# Patient Record
Sex: FEMALE
[De-identification: names, ages, dates, MRNs, and addresses within clinical notes are randomized per-mention and may not be internally consistent; named-entity substitution may affect disease eponyms.]

---

## 2017-04-20 ENCOUNTER — HOSPITAL ENCOUNTER (EMERGENCY)
Dept: HOSPITAL 14 - H.ER | Age: 42
Discharge: HOME | End: 2017-04-20
Payer: SELF-PAY

## 2017-04-20 VITALS — OXYGEN SATURATION: 100 % | TEMPERATURE: 98.3 F | HEART RATE: 77 BPM

## 2017-04-20 VITALS — SYSTOLIC BLOOD PRESSURE: 122 MMHG | DIASTOLIC BLOOD PRESSURE: 75 MMHG | RESPIRATION RATE: 14 BRPM

## 2017-04-20 DIAGNOSIS — R10.13: ICD-10-CM

## 2017-04-20 DIAGNOSIS — K29.70: Primary | ICD-10-CM

## 2017-04-20 DIAGNOSIS — F17.210: ICD-10-CM

## 2017-04-20 LAB
ALBUMIN/GLOB SERPL: 1.2 {RATIO} (ref 1–2.1)
ALP SERPL-CCNC: 70 U/L (ref 38–126)
ALT SERPL-CCNC: 24 U/L (ref 9–52)
AST SERPL-CCNC: 26 U/L (ref 14–36)
BASOPHILS # BLD AUTO: 0.1 K/UL (ref 0–0.2)
BASOPHILS NFR BLD: 1.5 % (ref 0–2)
BILIRUB SERPL-MCNC: 0.5 MG/DL (ref 0.2–1.3)
BUN SERPL-MCNC: 10 MG/DL (ref 7–17)
CALCIUM SERPL-MCNC: 10.4 MG/DL (ref 8.4–10.2)
CHLORIDE SERPL-SCNC: 109 MMOL/L (ref 98–107)
CO2 SERPL-SCNC: 22 MMOL/L (ref 22–30)
EOSINOPHIL # BLD AUTO: 0.1 K/UL (ref 0–0.7)
EOSINOPHIL NFR BLD: 1.4 % (ref 0–4)
ERYTHROCYTE [DISTWIDTH] IN BLOOD BY AUTOMATED COUNT: 13.2 % (ref 11.5–14.5)
GLOBULIN SER-MCNC: 3.5 GM/DL (ref 2.2–3.9)
GLUCOSE SERPL-MCNC: 76 MG/DL (ref 65–105)
HCT VFR BLD CALC: 40.6 % (ref 34–47)
LYMPHOCYTES # BLD AUTO: 2.9 K/UL (ref 1–4.3)
LYMPHOCYTES NFR BLD AUTO: 33.3 % (ref 20–40)
MCH RBC QN AUTO: 33 PG (ref 27–31)
MCHC RBC AUTO-ENTMCNC: 33.6 G/DL (ref 33–37)
MCV RBC AUTO: 98.2 FL (ref 81–99)
MONOCYTES # BLD: 0.6 K/UL (ref 0–0.8)
MONOCYTES NFR BLD: 7.4 % (ref 0–10)
NEUTROPHILS # BLD: 4.8 K/UL (ref 1.8–7)
NEUTROPHILS NFR BLD AUTO: 56.4 % (ref 50–75)
NRBC BLD AUTO-RTO: 0.1 % (ref 0–0)
PLATELET # BLD: 424 K/UL (ref 130–400)
PMV BLD AUTO: 7.1 FL (ref 7.2–11.7)
POTASSIUM SERPL-SCNC: 4.7 MMOL/L (ref 3.6–5)
PROT SERPL-MCNC: 7.5 G/DL (ref 6.3–8.2)
SODIUM SERPL-SCNC: 143 MMOL/L (ref 132–148)
WBC # BLD AUTO: 8.6 K/UL (ref 4.8–10.8)

## 2017-04-20 NOTE — ED PDOC
- Laboratory Results


Result Diagrams: 


 04/20/17 15:00





 04/20/17 15:00





- ECG


O2 Sat by Pulse Oximetry: 100 (RA)


Pulse Ox Interpretation: Normal





Medical Decision Making


Medical Decision Making: 


Time: 19:00


--Pending Ultrasound, reassessment, and disposition. 





Time: 8:40


--Abdomen US


FINDINGS:


Gallbladder: Within normal limits in appearance, without evidence of gallstones

, significant


gallbladder wall thickening, or pericholecystic fluid. Reportedly negative 

sonographic Huynh's sign.


Common bile duct: Does not appear abnormally dilated, measuring less than 6 mm 

in diameter.


Liver: Within normal limits in appearance. Measures 14.5 cm in length. Normal 

flow seen in the


main portal vein on color and Doppler imaging.


Pancreas: Imaged portions appear unremarkable.


Right kidney: Within normal limits in appearance. No evidence of hydronephrosis.


IMPRESSION:


No significant abnormality identified. No evidence of gallstones or 

cholecystitis.





Upon provider reevaluation patient is feeling better, is medically stable, and 

requires no further treatment in the ED at this time. Patient will be 

discharged home with Rx for Pepcid 20 mg. Counseling was provided and all 

questions were answered regarding diagnosis and need for follow up with Dr. Dozier for PMD and GI. There is agreement to discharge plan. Return if symptoms 

persist or worsen.





Clinical Impression: Gastritis and abdominal pain   











Scribe Attestation:


Documented by Olimpia Thomas, acting as a scribe for Avni Melgar MD.





Provider Scribe Attestation:


All medical record entries made by the Scribe were at my direction and 

personally dictated by me. I have reviewed the chart and agree that the record 

accurately reflects my personal performance of the history, physical exam, 

medical decision making, and the department course for this patient. I have 

also personally directed, reviewed, and agree with the discharge instructions 

and disposition.





Disposition


Counseled Patient/Family Regarding: Studies Performed, Diagnosis, Need For 

Followup, Rx Given





- Clinical Impression


Clinical Impression: 


 Abdominal pain, Gastritis





- POA


Present On Arrival: None





- Disposition


Disposition: Routine/Home


Disposition Time: 08:40


Condition: STABLE


Prescriptions: 


Famotidine [Pepcid] 20 mg PO Q12 #14 tab


Instructions:  Gastritis (ED)

## 2017-04-20 NOTE — US
EXAM:

  US Abdomen Limited, Right Upper Quadrant



CLINICAL HISTORY:

  41 years old, female; Pain; Abdominal pain; Epigastric; Additional info: Abd 

pain



TECHNIQUE:

  Real-time ultrasound of the right upper quadrant with image documentation.



EXAM DATE/TIME:

  4/20/2017 2:27 PM



COMPARISON:

  Prior abdominal ultrasound of 11/24/2015



FINDINGS:

  Gallbladder: Within normal limits in appearance, without evidence of 

gallstones, significant gallbladder wall thickening, or pericholecystic fluid. 

Reportedly negative sonographic Huynh's sign.



  Common bile duct: Does not appear abnormally dilated, measuring less than 6 

mm in diameter.



  Liver:  Within normal limits in appearance. Measures 14.5 cm in length.  

Normal flow seen in the main portal vein on color and Doppler imaging.



  Pancreas:  Imaged portions appear unremarkable.



  Right kidney:  Within normal limits in appearance. No evidence of 

hydronephrosis.



IMPRESSION:   

  No significant abnormality identified.  No evidence of gallstones or 

cholecystitis.

## 2017-04-20 NOTE — ED PDOC
HPI: Abdomen


Time Seen by Provider: 04/20/17 14:21


Chief Complaint (Nursing): Abdominal Pain


Chief Complaint (Provider): Abdominal Pain


History Per: Patient


History/Exam Limitations: no limitations


Onset/Duration Of Symptoms: Days (x1 month)


Current Symptoms Are (Timing): Still Present


Additional Complaint(s): 


42 y/o female with a past medical history of thyroid disease (hypo) who 

presents to the emergency department with a complaint of an intermittent 

abdominal pain x1 month. Associated with burning sensation during urination and 

a foul smelling urine. Denies vomiting, diarrhea, or fever. 





PMD: Dr. Dozier





Past Medical History


Reviewed: Historical Data, Nursing Documentation, Vital Signs


Vital Signs: 


 Last Vital Signs











Temp  98.3 F   04/20/17 13:50


 


Pulse  77   04/20/17 16:48


 


Resp  14   04/20/17 16:48


 


BP  122/75   04/20/17 16:48


 


Pulse Ox  100   04/20/17 16:48














- Medical History


PMH: Back Problems, Hyperthyroidism


   Denies: Diabetes, Hepatitis, HIV, HTN, Seizures, Sexually Transmitted Disease





- Surgical History


Surgical History: No Surg Hx





- Family History


Family History: States: Unknown Family Hx





- Social History


Current smoker - smoking cessation education provided: Yes (Heavy Smoker > 10 

Cigarettes Daily)


Alcohol: None


Drugs: Denies





- Home Medications


Home Medications: 


 Ambulatory Orders











 Medication  Instructions  Recorded


 


No Known Home Med  04/20/17














- Allergies


Allergies/Adverse Reactions: 


 Allergies











Allergy/AdvReac Type Severity Reaction Status Date / Time


 


No Known Allergies Allergy   Verified 08/03/16 13:22














Review of Systems


ROS Statement: Except As Marked, All Systems Reviewed And Found Negative


Constitutional: Negative for: Fever


Gastrointestinal: Positive for: Abdominal Pain.  Negative for: Vomiting, 

Diarrhea


Genitourinary Female: Positive for: Dysuria, Other (Foul smelling urine)





Physical Exam





- Reviewed


Nursing Documentation Reviewed: Yes


Vital Signs Reviewed: Yes





- Physical Exam


Appears: Positive for: Non-toxic, No Acute Distress


Head Exam: Positive for: ATRAUMATIC, NORMOCEPHALIC


Skin: Positive for: Normal Color, Warm, Dry


Neck: Positive for: Normal, Supple


Cardiovascular/Chest: Positive for: Regular Rate, Rhythm.  Negative for: Murmur


Respiratory: Positive for: Normal Breath Sounds.  Negative for: Accessory 

Muscle Use, Respiratory Distress


Gastrointestinal/Abdominal: Positive for: Soft, Tenderness (to the epigastric 

region).  Negative for: Normal Exam


Back: Positive for: Normal Inspection.  Negative for: L CVA Tenderness, R CVA 

Tenderness


Extremity: Positive for: Normal ROM


Neurologic/Psych: Positive for: Alert, Oriented





- Laboratory Results


Result Diagrams: 


 04/20/17 15:00





 04/20/17 15:00





- ECG


O2 Sat by Pulse Oximetry: 100 (RA)


Pulse Ox Interpretation: Normal





Medical Decision Making


Medical Decision Making: 


Time: 14:21


Initial impression: Abdominal pain


Initial plan:


--COMP Metabolic Panel


--ED Urine Pregnancy


--ED Urine Dipstick (POC)


--CBC w/ differential


--Sodium Chloride 1,000 ml  mls/hr


--Pepcid 20 mg IVP


--Abdomen Limited (GB Included) (US)


--Revaluation














Scribe Attestation:


Documented by Olimpia Thomas, acting as a scribe for Chuy Do MD.





Provider Scribe Attestation:


All medical record entries made by the Scribe were at my direction and 

personally dictated by me. I have reviewed the chart and agree that the record 

accurately reflects my personal performance of the history, physical exam, 

medical decision making, and the department course for this patient. I have 

also personally directed, reviewed, and agree with the discharge instructions 

and disposition.





Disposition





- Clinical Impression


Clinical Impression: 


 Abdominal pain





- Patient ED Disposition


Is Patient to be Admitted: Transfer of Care





- Disposition


Disposition: Transfer of Care


Disposition Time: 19:00


Condition: FAIR


Patient Signed Over To: Avni Melgar

## 2017-09-05 ENCOUNTER — HOSPITAL ENCOUNTER (EMERGENCY)
Dept: HOSPITAL 14 - H.ER | Age: 42
LOS: 1 days | Discharge: HOME | End: 2017-09-06
Payer: SELF-PAY

## 2017-09-05 DIAGNOSIS — N83.209: ICD-10-CM

## 2017-09-05 DIAGNOSIS — I10: ICD-10-CM

## 2017-09-05 DIAGNOSIS — R10.2: Primary | ICD-10-CM

## 2017-09-05 DIAGNOSIS — N85.2: ICD-10-CM

## 2017-09-05 DIAGNOSIS — R07.9: ICD-10-CM

## 2017-09-05 DIAGNOSIS — E78.5: ICD-10-CM

## 2017-09-05 DIAGNOSIS — E03.9: ICD-10-CM

## 2017-09-05 PROCEDURE — 87591 N.GONORRHOEAE DNA AMP PROB: CPT

## 2017-09-05 PROCEDURE — 76830 TRANSVAGINAL US NON-OB: CPT

## 2017-09-05 PROCEDURE — 80053 COMPREHEN METABOLIC PANEL: CPT

## 2017-09-05 PROCEDURE — 84484 ASSAY OF TROPONIN QUANT: CPT

## 2017-09-05 PROCEDURE — 87070 CULTURE OTHR SPECIMN AEROBIC: CPT

## 2017-09-05 PROCEDURE — 99284 EMERGENCY DEPT VISIT MOD MDM: CPT

## 2017-09-05 PROCEDURE — 85025 COMPLETE CBC W/AUTO DIFF WBC: CPT

## 2017-09-05 PROCEDURE — 81025 URINE PREGNANCY TEST: CPT

## 2017-09-05 PROCEDURE — 74177 CT ABD & PELVIS W/CONTRAST: CPT

## 2017-09-05 PROCEDURE — 87491 CHLMYD TRACH DNA AMP PROBE: CPT

## 2017-09-06 VITALS — OXYGEN SATURATION: 99 %

## 2017-09-06 VITALS
DIASTOLIC BLOOD PRESSURE: 81 MMHG | TEMPERATURE: 98.2 F | HEART RATE: 81 BPM | RESPIRATION RATE: 16 BRPM | SYSTOLIC BLOOD PRESSURE: 124 MMHG

## 2017-09-06 LAB
ALBUMIN/GLOB SERPL: 1.2 {RATIO} (ref 1–2.1)
ALP SERPL-CCNC: 83 U/L (ref 38–126)
ALT SERPL-CCNC: 28 U/L (ref 9–52)
AST SERPL-CCNC: 46 U/L (ref 14–36)
BASOPHILS # BLD AUTO: 0.1 K/UL (ref 0–0.2)
BASOPHILS NFR BLD: 0.8 % (ref 0–2)
BILIRUB SERPL-MCNC: 0.2 MG/DL (ref 0.2–1.3)
BUN SERPL-MCNC: 17 MG/DL (ref 7–17)
CALCIUM SERPL-MCNC: 10.6 MG/DL (ref 8.4–10.2)
CHLORIDE SERPL-SCNC: 109 MMOL/L (ref 98–107)
CO2 SERPL-SCNC: 23 MMOL/L (ref 22–30)
EOSINOPHIL # BLD AUTO: 0.2 K/UL (ref 0–0.7)
EOSINOPHIL NFR BLD: 1.7 % (ref 0–4)
ERYTHROCYTE [DISTWIDTH] IN BLOOD BY AUTOMATED COUNT: 13.3 % (ref 11.5–14.5)
GLOBULIN SER-MCNC: 3.4 GM/DL (ref 2.2–3.9)
GLUCOSE SERPL-MCNC: 83 MG/DL (ref 65–105)
HCT VFR BLD CALC: 39.6 % (ref 34–47)
LYMPHOCYTES # BLD AUTO: 3.2 K/UL (ref 1–4.3)
LYMPHOCYTES NFR BLD AUTO: 33.7 % (ref 20–40)
MCH RBC QN AUTO: 32.4 PG (ref 27–31)
MCHC RBC AUTO-ENTMCNC: 33.6 G/DL (ref 33–37)
MCV RBC AUTO: 96.3 FL (ref 81–99)
MONOCYTES # BLD: 0.7 K/UL (ref 0–0.8)
MONOCYTES NFR BLD: 7 % (ref 0–10)
NEUTROPHILS # BLD: 5.4 K/UL (ref 1.8–7)
NEUTROPHILS NFR BLD AUTO: 56.8 % (ref 50–75)
NRBC BLD AUTO-RTO: 0 % (ref 0–0)
PLATELET # BLD: 514 K/UL (ref 130–400)
PMV BLD AUTO: 6.8 FL (ref 7.2–11.7)
POTASSIUM SERPL-SCNC: 4 MMOL/L (ref 3.6–5)
PROT SERPL-MCNC: 7.5 G/DL (ref 6.3–8.2)
SODIUM SERPL-SCNC: 140 MMOL/L (ref 132–148)
WBC # BLD AUTO: 9.5 K/UL (ref 4.8–10.8)

## 2017-09-06 NOTE — US
HISTORY:

pelvic pain



COMPARISON:

None available.



TECHNIQUE:

Transvaginal pelvic ultrasound was performed.



FINDINGS:



UTERUS:

Measures 10.4 x 4.9 x 5.5 cm. Anteverted and normal in size. No 

fibroid or other mass lesion seen.



ENDOMETRIUM:

Measures 7.0 mm in diameter. Normal in appearance. 



CERVIX:

There is a small nabothian cyst in the cervix.



RIGHT OVARY:

Measures 2.9 x 1.7 x 2.7 cm. No solid mass. Normal flow. 



LEFT OVARY:

Not visualized. 



FREE FLUID:

No significant free fluid noted.



OTHER FINDINGS:

None. 



IMPRESSION:

No evidence of fibroid uterus, right ovarian cyst or adnexal mass.



The left ovary is not visualized.

## 2017-09-06 NOTE — ED PDOC
- Laboratory Results


Result Diagrams: 


 09/06/17 02:10





 09/06/17 02:10





- ECG


O2 Sat by Pulse Oximetry: 99 (RA)


Pulse Ox Interpretation: Normal





Medical Decision Making


Medical Decision Making: 


Receiving sign out:


Patient signed out to me by Kathleen Barajas PA-C, at 0600 pending CT results.


Scribe Attestation:


Documented by Socorro Anaya acting as a scribe for Mechelle Swain MD


Provider Scribe Attestation:


All medical record entries made by the Scribe were at my direction and 

personally dictated by me. I


have reviewed the chart and agree that the record accurately reflects my 

personal performance of the


history, physical exam, medical decision making, and the department course for 

this patient. I have


also personally directed, reviewed, and agree with the discharge instructions 

and disposition.





Disposition


Counseled Patient/Family Regarding: Studies Performed, Diagnosis, Need For 

Followup





- Clinical Impression


Clinical Impression: 


 Abdominal pain, Chest pain, Ovarian cyst








- POA


Present On Arrival: None





- Disposition


Referrals: 


[facility] [Outside]


Disposition: Routine/Home


Disposition Time: 06:30


Condition: GOOD


Additional Instructions: 


Follow up with your PCP in 2-3 days. 


Instructions:  Chest Pain (ED), Ovarian Cyst (ED)


Print Language: Kiswahili





Progress Note





- Review of Symptoms


Events since last encounter: 


Time: 0618


CT AP IMPRESSION:


Nonspecific nonobstructed bowel gas pattern


1 cm left ovarian cyst


Question faint gallstone versus artifact





Time: 0629


Pt feels better. Patient informed of CT results and is stable for discharge 

home. Patient informed to follow up with PCP and to return to ED if symptoms 

worsen or new symptoms arise.

## 2017-09-06 NOTE — CT
PROCEDURE:  CT Abdomen and Pelvis with contrast



HISTORY:

lower abd pain



COMPARISON:

None.



TECHNIQUE:

Contrast dose: 90 cc of Omnipaque 300.



Axial and reformatted coronal and sagittal CT images of the abdomen 

and pelvis were obtained after IV contrast administration.



Radiation dose:



Total exam DLP = 472.57 mGy-cm.



This CT exam was performed using one or more of the following dose 

reduction techniques: Automated exposure control, adjustment of the 

mA and/or kV according to patient size, and/or use of iterative 

reconstruction technique.



FINDINGS:



LOWER THORAX:

Unremarkable. 



LIVER:

Unremarkable. No gross lesion or ductal dilatation. 



GALLBLADDER AND BILE DUCTS:

Mild diffuse gallbladder wall thickening.  There is suspicious for 

small gallstone. The biliary tree is not dilated. 



PANCREAS:

Unremarkable. No gross lesion or ductal dilatation.



SPLEEN:

Unremarkable. 



ADRENALS:

Unremarkable. No mass. 



KIDNEYS AND URETERS:

Unremarkable. No hydronephrosis. No solid mass. 



VASCULATURE:

Unremarkable. No aortic aneurysm. 



BOWEL:

Unremarkable. No obstruction. No gross mural thickening. Few 

scattered colonic diverticulosis seen without evidence of 

diverticulitis



APPENDIX:

Normal appendix. 



PERITONEUM:

Unremarkable. No free fluid. No free air. 



LYMPH NODES:

Unremarkable. No enlarged lymph nodes. 



BLADDER:

Unremarkable. 



REPRODUCTIVE:

12 millimeter cyst seen at the left adnexa. The uterus is mildly 

enlarged.  Heterogeneous enhancement of the uterus with possible 

fluid adjacent to the uterus. Small amount of fluid seen in the 

endometrial cavity. The uterine cervix is also enlarged. 



BONES:

No acute fracture. 



OTHER FINDINGS:

None.



IMPRESSION:

Mild gallbladder wall thickening and questionable small gallstone.  

If clinically warranted further assessment by ultrasound may be 

obtained.



1.2 centimeters cyst at the left adnexa.  Heterogeneous enhancement 

and mild enlargement of the uterus and uterine cervix noted.  Further 

assessment by ultrasound is suggested.



Preliminary report was submitted by virtual Radiology.

## 2017-09-06 NOTE — ED PDOC
HPI: Abdomen


Time Seen by Provider: 09/05/17 23:32


Chief Complaint (Nursing): Female Genitourinary


Chief Complaint (Provider): abdominal pain


History Per: Patient, Family


History/Exam Limitations: no limitations


Onset/Duration Of Symptoms: Days (4), Waxing/Waning


Current Symptoms Are (Timing): Still Present


Location Of Pain/Discomfort: RLQ, LLQ, Suprapubic


Quality Of Discomfort: "Pain"


Additional History Per: Patient


Additional Complaint(s): 





40 y/o female presents with intermittent pelvic pain x 4 days.  Denies fever, 

nausea/vomiting, changes in bowel movements, dysuria, hematuria, vaginal 

bleeding/discharge.





Past Medical History


Reviewed: Historical Data, Nursing Documentation, Vital Signs


Vital Signs: 


 Last Vital Signs











Temp  97.8 F   09/05/17 23:22


 


Pulse  70   09/05/17 23:22


 


Resp  18   09/05/17 23:22


 


BP  120/78   09/05/17 23:22


 


Pulse Ox  99   09/06/17 04:54














- Medical History


PMH: Anxiety, HTN, Hypercholesterolemia, Hyperlipidemia, Hypothyroidism


   Denies: Chronic Kidney Disease





- Surgical History


Other surgeries: thyroid





- Family History


Family History: States: Unknown Family Hx





- Home Medications


Home Medications: 


 Ambulatory Orders











 Medication  Instructions  Recorded


 


RX: Acetaminophen [Tylenol] 650 mg PO Q6H PRN 04/14/15


 


RX: Levothyroxine [Synthroid] 125 mcg PO DAILY 04/14/15


 


RX: Sertraline HCl [Zoloft] 25 mg PO DAILY 04/14/15


 


RX: Aspirin [Ecotrin] 81 mg PO DAILY #0 tabec 04/15/15


 


RX: Atorvastatin [Lipitor] 20 mg PO HS #0 tab 04/15/15


 


RX: Nicotine 21 mg/24 hr [Nicoderm 1 patch TD DAILY #0 patch 04/15/15





Cq]  














- Allergies


Allergies/Adverse Reactions: 


 Allergies











Allergy/AdvReac Type Severity Reaction Status Date / Time


 


No Known Allergies Allergy   Verified 11/23/14 16:09














Review of Systems


ROS Statement: Except As Marked, All Systems Reviewed And Found Negative


Genitourinary Female: Positive for: Pelvic Pain





Physical Exam





- Reviewed


Nursing Documentation Reviewed: Yes


Vital Signs Reviewed: Yes





- Physical Exam


Appears: Positive for: Well, Non-toxic, No Acute Distress


Head Exam: Positive for: ATRAUMATIC, NORMAL INSPECTION, NORMOCEPHALIC


Skin: Positive for: Normal Color


Eye Exam: Positive for: Normal appearance


ENT: Positive for: Normal ENT Inspection


Cardiovascular/Chest: Positive for: Regular Rate, Rhythm


Respiratory: Positive for: Normal Breath Sounds


Gastrointestinal/Abdominal: Positive for: Bowel Sounds, Soft, Tenderness (

diffuse lower discomfort to palpation)


Pelvic Exam: Positive for: External Exam Normal, Speculum Exam Normal, No Cerv. 

Motion Tender, Tender Adnexa (b/l), Tender Uterus, Other (exam chaperoned by 

Izabel DELAROSA tech)


Back: Positive for: Normal Inspection


Extremity: Positive for: Normal ROM


Neurologic/Psych: Positive for: Alert, Oriented





- Laboratory Results


Result Diagrams: 


 09/06/17 02:10





 09/06/17 02:10





- ECG


ECG: Positive for: Viewed By Me (reviewed by ED attending)


ECG Rhythm: Positive for: 1st Degree Heart Block


O2 Sat by Pulse Oximetry: 99





- Progress


ED Course And Treament: 


labs, urine, TV u/s





EXAM:


US Pelvis, Transvaginal


EXAM DATE/TIME:


9/6/2017 12:03 AM


CLINICAL HISTORY:


41 years old, female; Pain; Pelvic pain


TECHNIQUE:


Real-time transvaginal pelvic ultrasound (complete) with image documentation. 

Transvaginal


imaging was used for better evaluation of the endometrium and adnexa.


COMPARISON:


No relevant prior studies available.


FINDINGS:


Uterus/cervix: The endometrium is unremarkable and is 8 mm thick. There are 

small cysts in the


lower uterine segment, compatible with nabothian cysts. No myometrial mass.


Right ovary: Unremarkable. No mass. No torsion.


Left ovary: The left ovary is not visualized.


Free fluid: There is a trace amount of free fluid in the pelvis, likely 

physiologic.


IMPRESSION:


No acute findings.








3:40


Patient now notes midsternal chest pain.  Denies shortness of breath, 

palpitations.  EKG, trop ordered


CT ordered to continued lower abd pain.











Disposition





- Clinical Impression


Clinical Impression: 


 Abdominal pain, Chest pain








- Patient ED Disposition


Is Patient to be Admitted: No





- Disposition


Disposition: Transfer of Care


Disposition Time: 06:00


Condition: STABLE


Patient Signed Over To: Mechelle Swain


Handoff Comments: pending CT

## 2017-09-08 NOTE — CARD
--------------- APPROVED REPORT --------------





EKG Measurement

Heart [id number]

OR [id number]

[id number]

[id number]

[id number]



<Conclusion>

Sinus bradycardia with 1st degree AV block

Septal infarct, age undetermined

Abnormal ECG

## 2017-10-06 ENCOUNTER — HOSPITAL ENCOUNTER (EMERGENCY)
Dept: HOSPITAL 14 - H.ER | Age: 42
LOS: 1 days | Discharge: HOME | End: 2017-10-07
Payer: COMMERCIAL

## 2017-10-06 VITALS — RESPIRATION RATE: 18 BRPM

## 2017-10-06 DIAGNOSIS — R51: Primary | ICD-10-CM

## 2017-10-06 DIAGNOSIS — M54.9: ICD-10-CM

## 2017-10-06 DIAGNOSIS — E05.90: ICD-10-CM

## 2017-10-06 DIAGNOSIS — H92.09: ICD-10-CM

## 2017-10-06 NOTE — ED PDOC
HPI: Back


Time Seen by Provider: 10/06/17 22:15


Chief Complaint (Nursing): Back Pain


Chief Complaint (Provider): Back pain


History Per: Patient


Additional Complaint(s): 





Pt c/o headache, bilateral ear, neck and back pain x5 days. Reports taking 

medication, no relief. 


No bowel or bladder dysfunction. 





Past Medical History


Reviewed: Nursing Documentation, Vital Signs


Vital Signs: 


 Last Vital Signs











Temp  99.2 F   10/06/17 22:06


 


Pulse  76   10/06/17 22:06


 


Resp  18   10/06/17 22:06


 


BP  123/76   10/06/17 22:06


 


Pulse Ox  97   10/06/17 22:06














- Medical History


PMH: Back Problems, Hyperthyroidism


   Denies: Diabetes, Hepatitis, HIV, HTN, Seizures, Sexually Transmitted Disease





- Family History


Family History: States: Unknown Family Hx





- Living Arrangements


Living Arrangements: With Family





- Social History


Current smoker - smoking cessation education provided: No


Alcohol: None


Drugs: Denies





- Home Medications


Home Medications: 


 Ambulatory Orders











 Medication  Instructions  Recorded


 


Famotidine [Pepcid] 20 mg PO Q12 #14 tab 04/20/17


 


Ibuprofen [Motrin] 600 mg PO Q6 #20 tab 10/06/17


 


Methylprednisolone [Medrol Dose 4 mg PO DAILY #21 mg 10/06/17





Pack (21 tabs)]  














- Allergies


Allergies/Adverse Reactions: 


 Allergies











Allergy/AdvReac Type Severity Reaction Status Date / Time


 


No Known Allergies Allergy   Verified 08/03/16 13:22














Review of Systems


ROS Statement: Except As Marked, All Systems Reviewed And Found Negative


Musculoskeletal: Positive for: Back Pain





Physical Exam





- Reviewed


Nursing Documentation Reviewed: Yes


Vital Signs Reviewed: Yes





- Physical Exam


Appears: Positive for: Well, Non-toxic, No Acute Distress


Head Exam: Positive for: ATRAUMATIC, NORMAL INSPECTION, NORMOCEPHALIC


Skin: Positive for: Normal Color, Warm, DRY


Eye Exam: Positive for: EOMI, Normal appearance, PERRL


ENT: Positive for: Normal ENT Inspection


Neck: Positive for: Normal, Painless ROM


Cardiovascular/Chest: Positive for: Regular Rate, Rhythm


Respiratory: Positive for: CNT, Normal Breath Sounds


Gastrointestinal/Abdominal: Positive for: Normal Exam, Bowel Sounds, Soft


Back: Positive for: Normal Inspection, Muscle Spasm (thoracic, lumbar 

paraspinal tenderness)


Extremity: Positive for: Normal ROM


Neurologic/Psych: Positive for: Alert, Oriented





- ECG


O2 Sat by Pulse Oximetry: 97





Medical Decision Making


Medical Decision Making: 





Pt medicated with Flexeril and Percocet





XRs reviewed: NAD, as read by ANA





Disposition





- Clinical Impression


Clinical Impression: 


 Headache, Otalgia, Back pain








- Disposition


Disposition: Routine/Home


Disposition Time: 22:00


Condition: STABLE


Prescriptions: 


Ibuprofen [Motrin] 600 mg PO Q6 #20 tab


Methylprednisolone [Medrol Dose Pack (21 tabs)] 4 mg PO DAILY #21 mg


Instructions:  Acute Headache (ED), Earache (ED), Back Pain (ED)


Forms:  [facility] (English)


Print Language: Albanian

## 2017-10-07 VITALS — HEART RATE: 70 BPM | SYSTOLIC BLOOD PRESSURE: 120 MMHG | TEMPERATURE: 98 F | DIASTOLIC BLOOD PRESSURE: 70 MMHG

## 2017-10-07 NOTE — RAD
HISTORY:

pain



No antecedent history of trauma



COMPARISON:

No prior.



FINDINGS:



BONES:

Alignment maintained. No fracture.



DISC SPACES:

Normal.



SOFT TISSUES:

Normal.



OTHER FINDINGS:

None.



IMPRESSION:

Unremarkable radiographs of the thoracic spine.



_____________________________________________



Please note: No preliminary report/ innterpretation of this 

examination provided by emergency department personnel.

## 2017-10-07 NOTE — RAD
PROCEDURE:  Radiographs of the Lumbar Spine.



HISTORY:

Pain. No history of recent/ related trauma provided







COMPARISON:

No prior.



FINDINGS:



BONES:

Normal alignment. No listhesis. No fracture.



DISC SPACES:

Unremarkable.



OTHER FINDINGS:

None.



IMPRESSION:

Unremarkable radiographs of the lumbar spine.



_____________________________________________



Please note: No preliminary report/ innterpretation of this 

examination provided by emergency department personnel.

## 2017-10-07 NOTE — RAD
PROCEDURE:  Cervical Spine Radiographs.



HISTORY:

Pain. No history of recent/ related trauma provided 



COMPARISON:

None. 



FINDINGS:



BONES:

Alignment maintained. No fracture.  Dens Intact. 



Incidental finding(s): Cervical ribs 



DISC SPACES:

Minimal degenerative changes limited to C5-6 



SOFT TISSUES:

Normal. No prevertebral soft tissue swelling. 



OTHER FINDINGS:

None.



IMPRESSION:

No significant or acute  findings to account for/ related to the 

clinical presentation.

## 2017-10-25 VITALS — OXYGEN SATURATION: 97 %

## 2017-11-29 ENCOUNTER — HOSPITAL ENCOUNTER (EMERGENCY)
Dept: HOSPITAL 14 - H.ER | Age: 42
Discharge: HOME | End: 2017-11-29
Payer: SELF-PAY

## 2017-11-29 VITALS
DIASTOLIC BLOOD PRESSURE: 68 MMHG | OXYGEN SATURATION: 98 % | SYSTOLIC BLOOD PRESSURE: 116 MMHG | TEMPERATURE: 98.6 F | RESPIRATION RATE: 22 BRPM | HEART RATE: 74 BPM

## 2017-11-29 DIAGNOSIS — E05.90: ICD-10-CM

## 2017-11-29 DIAGNOSIS — R42: Primary | ICD-10-CM

## 2017-11-29 LAB
BASOPHILS # BLD AUTO: 0.1 K/UL (ref 0–0.2)
BASOPHILS NFR BLD: 0.6 % (ref 0–2)
BUN SERPL-MCNC: 13 MG/DL (ref 7–17)
CALCIUM SERPL-MCNC: 10.7 MG/DL (ref 8.4–10.2)
CHLORIDE SERPL-SCNC: 109 MMOL/L (ref 98–107)
CO2 SERPL-SCNC: 25 MMOL/L (ref 22–30)
EOSINOPHIL # BLD AUTO: 0.1 K/UL (ref 0–0.7)
EOSINOPHIL NFR BLD: 1 % (ref 0–4)
ERYTHROCYTE [DISTWIDTH] IN BLOOD BY AUTOMATED COUNT: 13.2 % (ref 11.5–14.5)
GLUCOSE SERPL-MCNC: 85 MG/DL (ref 65–105)
HCT VFR BLD CALC: 40.5 % (ref 34–47)
LYMPHOCYTES # BLD AUTO: 2.7 K/UL (ref 1–4.3)
LYMPHOCYTES NFR BLD AUTO: 30 % (ref 20–40)
MAGNESIUM SERPL-MCNC: 2.2 MG/DL (ref 1.6–2.3)
MCH RBC QN AUTO: 32 PG (ref 27–31)
MCHC RBC AUTO-ENTMCNC: 33 G/DL (ref 33–37)
MCV RBC AUTO: 96.8 FL (ref 81–99)
MONOCYTES # BLD: 0.7 K/UL (ref 0–0.8)
MONOCYTES NFR BLD: 7.2 % (ref 0–10)
NEUTROPHILS # BLD: 5.6 K/UL (ref 1.8–7)
NEUTROPHILS NFR BLD AUTO: 61.2 % (ref 50–75)
NRBC BLD AUTO-RTO: 0 % (ref 0–0)
PLATELET # BLD: 517 K/UL (ref 130–400)
PMV BLD AUTO: 7.2 FL (ref 7.2–11.7)
POTASSIUM SERPL-SCNC: 4.4 MMOL/L (ref 3.6–5)
SODIUM SERPL-SCNC: 143 MMOL/L (ref 132–148)
WBC # BLD AUTO: 9.1 K/UL (ref 4.8–10.8)

## 2017-11-29 PROCEDURE — 80048 BASIC METABOLIC PNL TOTAL CA: CPT

## 2017-11-29 PROCEDURE — 83735 ASSAY OF MAGNESIUM: CPT

## 2017-11-29 PROCEDURE — 96374 THER/PROPH/DIAG INJ IV PUSH: CPT

## 2017-11-29 PROCEDURE — 81025 URINE PREGNANCY TEST: CPT

## 2017-11-29 PROCEDURE — 85025 COMPLETE CBC W/AUTO DIFF WBC: CPT

## 2017-11-29 PROCEDURE — 93005 ELECTROCARDIOGRAM TRACING: CPT

## 2017-11-29 PROCEDURE — 99283 EMERGENCY DEPT VISIT LOW MDM: CPT

## 2017-11-29 PROCEDURE — 70450 CT HEAD/BRAIN W/O DYE: CPT

## 2017-11-29 NOTE — ED PDOC
HPI: General Adult


Time Seen by Provider: 11/29/17 19:13


Chief Complaint (Nursing): Dizziness/Lightheaded


Chief Complaint (Provider): DIZZINESS


History Per: Patient (43 Y/O FEMALE HERE WITH DIZZINESS ROOM SPINNING 

ASSOCIATED WITH MOVEMENT OF HEAD.  NOTES INTERMITTENT VOMITING SECONDARY TO 

THIS.  NO FEVER/URI/COUGH.)





Past Medical History


Reviewed: Historical Data, Nursing Documentation, Vital Signs


Vital Signs: 


 Last Vital Signs











Temp  98.6 F   11/29/17 18:55


 


Pulse  74   11/29/17 18:55


 


Resp  22   11/29/17 18:55


 


BP  116/68   11/29/17 18:55


 


Pulse Ox  98   11/29/17 19:14














- Medical History


PMH: Back Problems, Hyperthyroidism


   Denies: Diabetes, Hepatitis, HIV, HTN, Seizures, Sexually Transmitted Disease





- Family History


Family History: States: Unknown Family Hx





- Home Medications


Home Medications: 


 Ambulatory Orders











 Medication  Instructions  Recorded


 


Famotidine [Pepcid] 20 mg PO Q12 #14 tab 04/20/17


 


Ibuprofen [Motrin] 600 mg PO Q6 #20 tab 10/06/17


 


Methylprednisolone [Medrol Dose 4 mg PO DAILY #21 mg 10/06/17





Pack (21 tabs)]  














- Allergies


Allergies/Adverse Reactions: 


 Allergies











Allergy/AdvReac Type Severity Reaction Status Date / Time


 


No Known Allergies Allergy   Verified 08/03/16 13:22














Review of Systems


ROS Statement: Except As Marked, All Systems Reviewed And Found Negative


ENT: Positive for: Other (DIZZINESS)





Physical Exam





- Reviewed


Nursing Documentation Reviewed: Yes


Vital Signs Reviewed: Yes





- Physical Exam


Appears: Positive for: Well, Non-toxic, No Acute Distress


Head Exam: Positive for: ATRAUMATIC, NORMAL INSPECTION, NORMOCEPHALIC


Skin: Positive for: Normal Color, Warm, DRY


Eye Exam: Positive for: Normal appearance, EOMI, PERRL, Nystagmus


ENT: Positive for: Normal ENT Inspection


Neck: Positive for: Normal, Painless ROM


Cardiovascular/Chest: Positive for: Regular Rate, Rhythm


Respiratory: Positive for: CNT, Normal Breath Sounds


Gastrointestinal/Abdominal: Positive for: Normal Exam, Bowel Sounds, Soft


Back: Positive for: Normal Inspection


Extremity: Positive for: Normal ROM


Neurologic/Psych: Positive for: Alert, Oriented





- Laboratory Results


Result Diagrams: 


 11/29/17 19:30





Urine Pregnancy POC: Negative





- ECG


O2 Sat by Pulse Oximetry: 98





Disposition





- Clinical Impression


Clinical Impression: 


 Dizziness








- Patient ED Disposition


Is Patient to be Admitted: No





- Disposition


Disposition: Transfer of Care


Disposition Time: 20:05


Condition: STABLE


Forms:  [facility] (English)


Patient Signed Over To: Sondra Phillip


Handoff Comments: head ct/labs/re-evaluation

## 2017-11-29 NOTE — CT
EXAM:

  CT Head Without Intravenous Contrast



CLINICAL HISTORY:

  42 years old, female; Signs and symptoms; Dizziness; Additional info: 

Vertigo. Sent phy. Doc.



TECHNIQUE:

  Axial computed tomography images of the head/brain without intravenous 

contrast.  All CT scans at this facility use one or more dose reduction 

techniques, viz.: automated exposure control; ma/kV adjustment per patient size 

(including targeted exams where dose is matched to indication; i.e. head); or 

iterative reconstruction technique.

  Coronal and sagittal reformatted images were created and reviewed.



COMPARISON:

  CT HEAD OR BRAIN W/O CONT 2013-08-30 15:10





FINDINGS:

  Brain: No acute intracranial hemorrhage.  No significant white matter 

disease.  No edema.

  Ventricles: No significant ventriculomegaly.

  Bones:  No acute displaced fracture.

  Sinuses:  Unremarkable as visualized.  No acute sinusitis.

  Mastoid air cells:  Unremarkable as visualized.  No mastoid effusion.



IMPRESSION:     

No acute intracranial hemorrhage, or suspicious mass effect.

## 2017-11-29 NOTE — ED PDOC
- Laboratory Results


Result Diagrams: 


 11/29/17 19:30





 11/29/17 19:30


Urine Pregnancy POC: Negative





- ECG


O2 Sat by Pulse Oximetry: 98





- Progress


ED Course And Treament: 





42yoF with vertigo. received:





CT scan:NAD. 


PT symptoms improved. 


PT will be Rx zofran and meclzine. 





 











Temp Pulse Resp BP Pulse Ox


 


 98.6 F   74   22   116/68   98 


 


 11/29/17 18:55  11/29/17 18:55  11/29/17 18:55  11/29/17 18:55  11/29/17 20:05














 Active Medications








Discontinued Medications





Sodium Chloride (Sodium Chloride 0.9%)  1,000 mls @ 1,000 mls/hr IV .Q1H STA


   Stop: 11/29/17 20:11


   Last Admin: 11/29/17 19:56  Dose: 1,000 mls/hr





eMAR Start Stop


 Document     11/29/17 19:56  CS  (Rec: 11/29/17 19:56  CS  [id number])


     Intravenous Solution


      Start Date                                 11/29/17


      Start Time                                 19:56





Meclizine HCl (Antivert)  25 mg PO STAT STA


   Stop: 11/29/17 19:13


   Last Admin: 11/29/17 19:56  Dose: 25 mg





Meclizine HCl (Antivert) Confirm Administered Dose 25 mg PO .STK-MED ONE


   Stop: 11/29/17 19:36


Metoclopramide HCl (Reglan)  10 mg IVP ONCE ONE


   Stop: 11/29/17 19:13


   Last Admin: 11/29/17 19:56  Dose: 10 mg





IVP Administration


 Document     11/29/17 19:56  CS  (Rec: 11/29/17 19:57  CS  [id number])


     Charges for Administration


      # of IVP Administrations                   1





Metoclopramide HCl (Reglan) Confirm Administered Dose 10 mg .ROUTE .STK-MED ONE


   Stop: 11/29/17 19:36











Medical Decision Making


Medical Decision Making: 





PT advised to f.u with neurology for further eval of vertigo. 





Disposition





- Clinical Impression


Clinical Impression: 


 Dizziness








- POA


Present On Arrival: None





- Disposition


Referrals: 


 Service [Outside]


[facility] [Provider Group]


[facility] [Provider Group]


Disposition: Routine/Home


Disposition Time: 20:27


Condition: STABLE


Prescriptions: 


Meclizine [Meclizine*] 25 mg PO Q6 #30 tab


Ondansetron HCl [Zofran] 4 mg PO Q6 #20 tablet


Instructions:  Vertigo (ED)


Forms:  [facility] (English)





Progress Note





- Review of Symptoms


General: No: Chills, Night Sweats, Fatigue, Malaise, Appetite, Other


HEENT: No: Head Aches, Visual Changes, Eye Pain, Ear Pain, Dysphasia, Sinus 

Congestion, Post Nasal Drip, Sore Throat, Other


Pulmonary: No: Dyspnea, Cough, Pleuritic Chest Pain, Other


Cardiovascular: No: Chest Pain, Palpitations, Orthopnea, Paroxysmal Noc. Dyspnea

, Edema, Light Headedness, Other


Gastrointestinal: No: Nausea, Vomiting, Abdominal Pain, Diarrhea, Constipation, 

Melena, Hematochezia, Other


Genitourinary: No: Dysuria, Frequency, Incontinence, Hematuria, Retention, Other


Musculoskeletal: No: Muscle Pain, Joint Pain, Other


Neurological: No: Weakness, Numbness, Incoordination, Change in speech, 

Confusion, Seizures, Other

## 2017-11-30 NOTE — CARD
--------------- APPROVED REPORT --------------





EKG Measurement

Heart [id number]

WI [id number]

[id number]

[id number]

[id number]



<Conclusion>

Normal sinus rhythm

Normal ECG

## 2018-02-07 ENCOUNTER — HOSPITAL ENCOUNTER (EMERGENCY)
Dept: HOSPITAL 14 - H.ER | Age: 43
Discharge: LEFT BEFORE DISCHARGE | End: 2018-02-07
Payer: SELF-PAY

## 2018-02-07 VITALS
OXYGEN SATURATION: 98 % | TEMPERATURE: 97.2 F | DIASTOLIC BLOOD PRESSURE: 60 MMHG | HEART RATE: 71 BPM | SYSTOLIC BLOOD PRESSURE: 106 MMHG | RESPIRATION RATE: 18 BRPM

## 2018-02-07 VITALS — BODY MASS INDEX: 23.9 KG/M2

## 2018-02-07 DIAGNOSIS — N39.0: Primary | ICD-10-CM

## 2018-02-07 LAB
ALBUMIN SERPL-MCNC: 3.8 G/DL (ref 3.5–5)
ALBUMIN/GLOB SERPL: 1.2 {RATIO} (ref 1–2.1)
ALT SERPL-CCNC: 24 U/L (ref 9–52)
AST SERPL-CCNC: 30 U/L (ref 14–36)
BACTERIA #/AREA URNS HPF: (no result) /[HPF]
BASOPHILS # BLD AUTO: 0.1 K/UL (ref 0–0.2)
BASOPHILS NFR BLD: 0.7 % (ref 0–2)
BILIRUB UR-MCNC: NEGATIVE MG/DL
BUN SERPL-MCNC: 7 MG/DL (ref 7–17)
CALCIUM SERPL-MCNC: 10.1 MG/DL (ref 8.4–10.2)
COLOR UR: YELLOW
EOSINOPHIL # BLD AUTO: 0.1 K/UL (ref 0–0.7)
EOSINOPHIL NFR BLD: 1.8 % (ref 0–4)
ERYTHROCYTE [DISTWIDTH] IN BLOOD BY AUTOMATED COUNT: 13.8 % (ref 11.5–14.5)
GFR NON-AFRICAN AMERICAN: > 60
GLUCOSE UR STRIP-MCNC: (no result) MG/DL
HGB BLD-MCNC: 12.8 G/DL (ref 12–16)
LEUKOCYTE ESTERASE UR-ACNC: (no result) LEU/UL
LYMPHOCYTES # BLD AUTO: 2.5 K/UL (ref 1–4.3)
LYMPHOCYTES NFR BLD AUTO: 30.5 % (ref 20–40)
MCH RBC QN AUTO: 32.4 PG (ref 27–31)
MCHC RBC AUTO-ENTMCNC: 33.9 G/DL (ref 33–37)
MCV RBC AUTO: 95.8 FL (ref 81–99)
MONOCYTES # BLD: 0.8 K/UL (ref 0–0.8)
MONOCYTES NFR BLD: 10.1 % (ref 0–10)
NEUTROPHILS # BLD: 4.7 K/UL (ref 1.8–7)
NEUTROPHILS NFR BLD AUTO: 56.9 % (ref 50–75)
NRBC BLD AUTO-RTO: 0.1 % (ref 0–0)
PH UR STRIP: 6 [PH] (ref 5–8)
PLATELET # BLD: 460 K/UL (ref 130–400)
PMV BLD AUTO: 7.4 FL (ref 7.2–11.7)
PROT UR STRIP-MCNC: NEGATIVE MG/DL
RBC # BLD AUTO: 3.96 MIL/UL (ref 3.8–5.2)
RBC # UR STRIP: (no result) /UL
SP GR UR STRIP: 1.01 (ref 1–1.03)
SQUAMOUS EPITHIAL: 1 /HPF (ref 0–5)
URINE CLARITY: (no result)
URINE NITRATE: POSITIVE
UROBILINOGEN UR-MCNC: (no result) MG/DL (ref 0.2–1)
WBC # BLD AUTO: 8.2 K/UL (ref 4.8–10.8)

## 2018-02-07 NOTE — ED PDOC
HPI: Abdomen


Time Seen by Provider: 02/07/18 10:02


Chief Complaint (Nursing): GI Problem


Chief Complaint (Provider): Abd pain


History Per: Patient ( )


History/Exam Limitations: no limitations


Onset/Duration Of Symptoms: Days (2 months)


Additional Complaint(s): 





2 months of abd pain, nonbloody vomit and diarrhea.  No weakness, headaches, 

dizziness, dysuria.  Last period 2 months ago.  No back pain.  No fever.  No 

chest pain.  Wants a blood bhcg test. 





Past Medical History


Reviewed: Nursing Documentation, Vital Signs


Vital Signs: 


 Last Vital Signs











Temp  97.2 F L  02/07/18 09:44


 


Pulse  71   02/07/18 09:44


 


Resp  18   02/07/18 09:44


 


BP  106/60   02/07/18 09:44


 


Pulse Ox  98   02/07/18 11:18














- Medical History


PMH: Hypothyroidism


   Denies: Chronic Kidney Disease





- Surgical History


Surgical History: No Surg Hx





- Family History


Family History: States: Unknown Family Hx





- Living Arrangements


Living Arrangements: With Family





- Social History


Alcohol: None


Drugs: Denies





- Home Medications


Home Medications: 


 Ambulatory Orders











 Medication  Instructions  Recorded


 


Acetaminophen [Tylenol] 650 mg PO Q6H PRN 04/14/15


 


Levothyroxine [Synthroid] 125 mcg PO DAILY 04/14/15


 


Sertraline HCl [Zoloft] 25 mg PO DAILY 04/14/15


 


Aspirin [Ecotrin] 81 mg PO DAILY #0 tabec 04/15/15


 


Atorvastatin [Lipitor] 20 mg PO HS #0 tab 04/15/15


 


Nicotine 21 mg/24 hr [Nicoderm Cq] 1 patch TD DAILY #0 patch 04/15/15


 


Nitrofurantoin Macrocrystals 100 mg PO BID #10 cap 02/07/18





[Macrobid]  














- Allergies


Allergies/Adverse Reactions: 


 Allergies











Allergy/AdvReac Type Severity Reaction Status Date / Time


 


No Known Allergies Allergy   Verified 11/23/14 16:09














Review of Systems


ROS Statement: Except As Marked, All Systems Reviewed And Found Negative


Gastrointestinal: Positive for: Nausea, Vomiting, Abdominal Pain, Diarrhea





Physical Exam





- Reviewed


Nursing Documentation Reviewed: Yes


Vital Signs Reviewed: Yes





- Physical Exam


Appears: Positive for: Well, Non-toxic, No Acute Distress


Head Exam: Positive for: ATRAUMATIC, NORMAL INSPECTION, NORMOCEPHALIC


Skin: Positive for: Normal Color, Warm, DRY


Eye Exam: Positive for: EOMI, Normal appearance, PERRL


ENT: Positive for: Normal ENT Inspection


Neck: Positive for: Normal, Painless ROM


Cardiovascular/Chest: Positive for: Regular Rate, Rhythm


Respiratory: Positive for: CNT, Normal Breath Sounds


Gastrointestinal/Abdominal: Positive for: Bowel Sounds, Soft, Tenderness (

diffuse mild)


Back: Positive for: Normal Inspection.  Negative for: L CVA Tenderness, R CVA 

Tenderness


Extremity: Positive for: Normal ROM.  Negative for: Tenderness, Pedal Edema


Neurologic/Psych: Positive for: Alert, Oriented





- Laboratory Results


Result Diagrams: 


 02/07/18 10:29





 02/07/18 10:29


Interpretation Of Abn Labs: urine wbc


Urine dip results: Positive for: Leukocyte Esterase





- ECG


O2 Sat by Pulse Oximetry: 98


Pulse Ox Interpretation: Normal





- Progress


ED Course And Treament: 





1202:  Stable.  AAOx3.  No pain.  Refusing ct for evaluation of pain in abd.  

Aware of possible death or decreased functioning from any cause of her pain 

that we are not able to identify due to not getting a cat scan.   Pt. has 

capacity to make decisions.  Significant other at bedside and agrees with pt. 

decision.  





Disposition





- Clinical Impression


Clinical Impression: 


 Abdominal pain, UTI (urinary tract infection)








- Patient ED Disposition


Is Patient to be Admitted: No





- Disposition


Referrals: 


[facility] [Outside] - 02/08/18


Disposition: Against Medical Advice


Disposition Time: 12:05


Condition: FAIR


Additional Instructions: 


You are going against medical advice.  You are refusing to get a cat scan.  You 

have abdominal pain and we are not able to help identify what is going on 

without the cat scan.  You are taking your own risk and are aware of possible 

death or decreased functioning from the cause of your pain.  Return right away 

for further evaluation and treatment. 





Va contra consejos mdicos. Te niegas a obtener un escaneo de britt. Tiene dolor 

abdominal y no podemos ayudar a identificar lo que est sucediendo sin la 

exploracin de britt. Usted est asumiendo patricia propio riesgo y est consciente de 

la posible muerte o disminucin del funcionamiento de la causa de patricia dolor. 

Regrese de inmediato para modesta evaluacin y tratamiento adicional.


Prescriptions: 


Nitrofurantoin Macrocrystals [Macrobid] 100 mg PO BID #10 cap


Instructions:  Urinary Tract Infection in Women (ED), Acute Abdominal Pain (ED)


Print Language: Indonesian

## 2018-03-11 ENCOUNTER — HOSPITAL ENCOUNTER (EMERGENCY)
Dept: HOSPITAL 14 - H.ER | Age: 43
Discharge: HOME | End: 2018-03-11
Payer: SELF-PAY

## 2018-03-11 VITALS
DIASTOLIC BLOOD PRESSURE: 97 MMHG | RESPIRATION RATE: 16 BRPM | SYSTOLIC BLOOD PRESSURE: 100 MMHG | OXYGEN SATURATION: 100 % | HEART RATE: 68 BPM | TEMPERATURE: 98.3 F

## 2018-03-11 VITALS — BODY MASS INDEX: 23.9 KG/M2

## 2018-03-11 DIAGNOSIS — E03.9: ICD-10-CM

## 2018-03-11 DIAGNOSIS — I10: ICD-10-CM

## 2018-03-11 DIAGNOSIS — Z85.850: ICD-10-CM

## 2018-03-11 DIAGNOSIS — Z79.82: ICD-10-CM

## 2018-03-11 DIAGNOSIS — N39.0: Primary | ICD-10-CM

## 2018-03-11 DIAGNOSIS — Z85.41: ICD-10-CM

## 2018-03-11 LAB
BACTERIA #/AREA URNS HPF: (no result) /[HPF]
BILIRUB UR-MCNC: NEGATIVE MG/DL
COLOR UR: (no result)
GLUCOSE UR STRIP-MCNC: (no result) MG/DL
LEUKOCYTE ESTERASE UR-ACNC: (no result) LEU/UL
PH UR STRIP: 5 [PH] (ref 5–8)
PROT UR STRIP-MCNC: NEGATIVE MG/DL
RBC # UR STRIP: NEGATIVE /UL
SP GR UR STRIP: 1.01 (ref 1–1.03)
SQUAMOUS EPITHIAL: < 1 /HPF (ref 0–5)
URINE CLARITY: CLEAR
UROBILINOGEN UR-MCNC: (no result) MG/DL (ref 0.2–1)

## 2018-03-11 NOTE — ED PDOC
HPI: Female  Pain


Time Seen by Provider: 18 18:11


Chief Complaint (Nursing): Female Genitourinary


Chief Complaint (Provider): Female Genitourinary


History Per: Patient


History/Exam Limitations: no limitations


Onset/Duration Of Symptoms: Other (x2 weeks)


Current Symptoms Are (Timing): Still Present


Additional Complaint(s): 





42 year old female presents to ED with complaints of dysuria x2 weeks and has a 

past medical history of thyroid cancer and pre-cervical cancer. (-) hematuria. (

+) suprapubic pain.





[employer] : [id number]


PCP: Miriam Tsai





Past Medical History


Reviewed: Historical Data, Nursing Documentation, Vital Signs


Vital Signs: 





 Last Vital Signs











Temp  98.3 F   18 17:12


 


Pulse  68   18 17:12


 


Resp  16   18 17:12


 


BP  100/97 H  18 17:12


 


Pulse Ox  100   18 17:12














- Medical History


PMH: Anxiety, HTN, Hypercholesterolemia, Hyperlipidemia, Hypothyroidism


   Denies: Chronic Kidney Disease





- Family History


Family History: States: Unknown Family Hx





- Living Arrangements


Living Arrangements: With Family





- Home Medications


Home Medications: 


 Ambulatory Orders











 Medication  Instructions  Recorded


 


Acetaminophen [Tylenol] 650 mg PO Q6H PRN 04/14/15


 


Levothyroxine [Synthroid] 125 mcg PO DAILY 04/14/15


 


Sertraline HCl [Zoloft] 25 mg PO DAILY 04/14/15


 


Aspirin [Ecotrin] 81 mg PO DAILY #0 tabec 04/15/15


 


Atorvastatin [Lipitor] 20 mg PO HS #0 tab 04/15/15


 


Nicotine 21 mg/24 hr [Nicoderm Cq] 1 patch TD DAILY #0 patch 04/15/15


 


Nitrofurantoin Macrocrystals 100 mg PO BID #10 cap 18





[Macrobid]  


 


Sulfamethoxazole/Trimethoprim 1 tab PO BID #20 tab 18





[Bactrim  mg-160 mg]  














- Allergies


Allergies/Adverse Reactions: 


 Allergies











Allergy/AdvReac Type Severity Reaction Status Date / Time


 


No Known Allergies Allergy   Verified 14 16:09














Review of Systems


ROS Statement: Except As Marked, All Systems Reviewed And Found Negative


Gastrointestinal: Positive for: Abdominal Pain (suprapubic pain)


Genitourinary Female: Positive for: Dysuria.  Negative for: Hematuria





Physical Exam





- Reviewed


Nursing Documentation Reviewed: Yes


Vital Signs Reviewed: Yes





- Physical Exam


Appears: Positive for: Non-toxic, No Acute Distress


Gastrointestinal/Abdominal: Positive for: Soft, Tenderness (Suprapubic 

tenderness)


Back: Positive for: L CVA Tenderness, R CVA Tenderness.  Negative for: Normal 

Inspection





- ECG


O2 Sat by Pulse Oximetry: 100 (RA)


Pulse Ox Interpretation: Normal





Medical Decision Making


Medical Decision Makin


Initial impression: UTI r/o pyelonephritis


Initial plan:


* UA








Scribe Attestation:


Documented by Radha Damon, acting as a scribe for Marko Jones PA-C.





Provider Scribe Attestation:


All medical record entries made by the Scribe were at my direction and 

personally dictated by me. I have reviewed the chart and agree that the record 

accurately reflects my personal performance of the history, physical exam, 

medical decision making, and the department course for this patient. I have 

also personally directed, reviewed, and agree with the discharge instructions 

and disposition.





Disposition





- Clinical Impression


Clinical Impression: 


 Urinary tract bacterial infections, UTI (urinary tract infection)








- Disposition


Referrals: 


[facility] [Outside]


Disposition Time: 20:11


Condition: GOOD


Prescriptions: 


Sulfamethoxazole/Trimethoprim [Bactrim  mg-160 mg] 1 tab PO BID #20 tab


Instructions:  Urinary Tract Infections in Adults, Kidney Infection (DC)


Forms:  [facility] (English)





Results





- Lab Results


Lab Results: 

















  18





  19:30


 


Urine Color  Straw


 


Urine Clarity  Clear


 


Urine pH  5.0


 


Ur Specific Gravity  1.010


 


Urine Protein  Negative


 


Urine Glucose (UA)  Neg


 


Urine Ketones  Negative


 


Urine Blood  Negative


 


Urine Nitrate  Negative


 


Urine Bilirubin  Negative


 


Urine Urobilinogen  0.2-1.0


 


Ur Leukocyte Esterase  Trace


 


Urine RBC (Auto)  5 H


 


Urine Microscopic WBC  3


 


Ur Squamous Epith Cells  < 1


 


Urine Bacteria  Rare

## 2018-04-12 ENCOUNTER — HOSPITAL ENCOUNTER (EMERGENCY)
Dept: HOSPITAL 14 - H.ER | Age: 43
Discharge: HOME | End: 2018-04-12
Payer: SELF-PAY

## 2018-04-12 VITALS — BODY MASS INDEX: 23.9 KG/M2

## 2018-04-12 VITALS
HEART RATE: 82 BPM | TEMPERATURE: 98 F | RESPIRATION RATE: 15 BRPM | SYSTOLIC BLOOD PRESSURE: 118 MMHG | OXYGEN SATURATION: 99 % | DIASTOLIC BLOOD PRESSURE: 74 MMHG

## 2018-04-12 DIAGNOSIS — I10: ICD-10-CM

## 2018-04-12 DIAGNOSIS — Z79.82: ICD-10-CM

## 2018-04-12 DIAGNOSIS — R07.1: Primary | ICD-10-CM

## 2018-04-12 DIAGNOSIS — E78.00: ICD-10-CM

## 2018-04-12 DIAGNOSIS — E03.9: ICD-10-CM

## 2018-04-12 DIAGNOSIS — F41.9: ICD-10-CM

## 2018-04-12 LAB
ALBUMIN SERPL-MCNC: 3.7 G/DL (ref 3.5–5)
ALBUMIN/GLOB SERPL: 1.2 {RATIO} (ref 1–2.1)
ALT SERPL-CCNC: 28 U/L (ref 9–52)
AST SERPL-CCNC: 27 U/L (ref 14–36)
BASOPHILS # BLD AUTO: 0.1 K/UL (ref 0–0.2)
BASOPHILS NFR BLD: 0.7 % (ref 0–2)
BUN SERPL-MCNC: 9 MG/DL (ref 7–17)
CALCIUM SERPL-MCNC: 10.1 MG/DL (ref 8.4–10.2)
EOSINOPHIL # BLD AUTO: 0.2 K/UL (ref 0–0.7)
EOSINOPHIL NFR BLD: 2.1 % (ref 0–4)
ERYTHROCYTE [DISTWIDTH] IN BLOOD BY AUTOMATED COUNT: 13.9 % (ref 11.5–14.5)
GFR NON-AFRICAN AMERICAN: > 60
HGB BLD-MCNC: 12.5 G/DL (ref 12–16)
LYMPHOCYTES # BLD AUTO: 2.1 K/UL (ref 1–4.3)
LYMPHOCYTES NFR BLD AUTO: 25.4 % (ref 20–40)
MCH RBC QN AUTO: 33 PG (ref 27–31)
MCHC RBC AUTO-ENTMCNC: 33.9 G/DL (ref 33–37)
MCV RBC AUTO: 97.2 FL (ref 81–99)
MONOCYTES # BLD: 0.6 K/UL (ref 0–0.8)
MONOCYTES NFR BLD: 7 % (ref 0–10)
NEUTROPHILS # BLD: 5.2 K/UL (ref 1.8–7)
NEUTROPHILS NFR BLD AUTO: 64.8 % (ref 50–75)
NRBC BLD AUTO-RTO: 0.4 % (ref 0–0)
PLATELET # BLD: 450 K/UL (ref 130–400)
PMV BLD AUTO: 7.7 FL (ref 7.2–11.7)
RBC # BLD AUTO: 3.8 MIL/UL (ref 3.8–5.2)
WBC # BLD AUTO: 8.1 K/UL (ref 4.8–10.8)

## 2018-04-12 NOTE — RAD
HISTORY:

chest pain  



COMPARISON:

04/14/2015



TECHNIQUE:

Chest PA and lateral



FINDINGS:



LUNGS:

No active pulmonary disease.



PLEURA:

No significant pleural effusion identified. No pneumothorax apparent.



CARDIOVASCULAR:

 No radiographic findings to suggest acute or significant 

cardiovascular disease.



OSSEOUS STRUCTURES:

No significant abnormalities.



VISUALIZED UPPER ABDOMEN:

Normal.



OTHER FINDINGS:

None.



IMPRESSION:

No active disease. No significant interval change compared to the 

prior examination(s).

## 2018-04-12 NOTE — ED PDOC
HPI: Chest Pain


Time Seen by Provider: 04/12/18 13:03


Chief Complaint (Nursing): Chest Pain


History Per: Patient


Onset/Duration Of Symptoms: Days (4)


Current Symptoms Are (Timing): Intermittent Episodes


Severity: Mild


Pain Scale Rating Of: 3


Quality: Sharp


Exacerbating Factors: Deep Breathing


Alleviating Factors: None


Additional Complaint(s): 





Sharp left sided chest pain radiating to neck x 4 days. Worse on inspiration. 

No SOB or cough





Past Medical History


Vital Signs: 


 Last Vital Signs











Temp  98.3 F   04/12/18 12:58


 


Pulse  80   04/12/18 12:58


 


Resp  19   04/12/18 12:58


 


BP  101/50 L  04/12/18 12:58


 


Pulse Ox  98   04/12/18 13:12














- Medical History


PMH: Anxiety, HTN, Hypercholesterolemia, Hyperlipidemia, Hypothyroidism


   Denies: Chronic Kidney Disease





- Surgical History


Other surgeries: Thyroid surgery





- Family History


Family History: States: Unknown Family Hx





- Home Medications


Home Medications: 


 Ambulatory Orders











 Medication  Instructions  Recorded


 


Acetaminophen [Tylenol] 650 mg PO Q6H PRN 04/14/15


 


Levothyroxine [Synthroid] 125 mcg PO DAILY 04/14/15


 


Sertraline HCl [Zoloft] 25 mg PO DAILY 04/14/15


 


Aspirin [Ecotrin] 81 mg PO DAILY #0 tabec 04/15/15


 


Atorvastatin [Lipitor] 20 mg PO HS #0 tab 04/15/15


 


Nicotine 21 mg/24 hr [Nicoderm Cq] 1 patch TD DAILY #0 patch 04/15/15


 


Nitrofurantoin Macrocrystals 100 mg PO BID #10 cap 02/07/18





[Macrobid]  


 


Sulfamethoxazole/Trimethoprim 1 tab PO BID #20 tab 03/11/18





[Bactrim  mg-160 mg]  


 


Naproxen [Naprosyn] 500 mg PO Q12H #20 tab 04/12/18














- Allergies


Allergies/Adverse Reactions: 


 Allergies











Allergy/AdvReac Type Severity Reaction Status Date / Time


 


No Known Allergies Allergy   Verified 11/23/14 16:09














Review of Systems


ROS Statement: Except As Marked, All Systems Reviewed And Found Negative


Cardiovascular: Positive for: Chest Pain





Physical Exam





- Reviewed


Nursing Documentation Reviewed: Yes


Vital Signs Reviewed: Yes





- Physical Exam


Appears: Positive for: Non-toxic, No Acute Distress


Head Exam: Positive for: ATRAUMATIC, NORMAL INSPECTION, NORMOCEPHALIC


Skin: Positive for: Normal Color, Warm, DRY


Eye Exam: Positive for: EOMI, Normal appearance, PERRL


ENT: Positive for: Normal ENT Inspection


Neck: Positive for: Normal, Painless ROM


Cardiovascular/Chest: Positive for: Regular Rate, Rhythm.  Negative for: Chest 

Non Tender (Ant chest wall tenderness, reproducible)


Respiratory: Positive for: CNT, Normal Breath Sounds


Gastrointestinal/Abdominal: Positive for: Normal Exam, Soft


Back: Positive for: Normal Inspection


Extremity: Positive for: Normal ROM.  Negative for: Calf Tenderness, Swelling


Neurologic/Psych: Positive for: Alert, Oriented





- Laboratory Results


Result Diagrams: 


 04/12/18 13:45





 04/12/18 13:45





- ECG


O2 Sat by Pulse Oximetry: 98





Disposition





- Clinical Impression


Clinical Impression: 


 Pleuritic chest pain








- Patient ED Disposition


Is Patient to be Admitted: No





- Disposition


Referrals: 


Provider MIKALA, [Primary Care Provider] - 


[facility] [Outside]


Disposition: Routine/Home


Disposition Time: 16:44


Condition: FAIR


Prescriptions: 


Naproxen [Naprosyn] 500 mg PO Q12H #20 tab


Instructions:  Pleuritic Chest Pain


Forms:  [facility] (English)

## 2018-04-13 NOTE — CARD
--------------- APPROVED REPORT --------------





EKG Measurement

Heart [id number]

[place] [id number]

[id number]

[id number]

[id number]



<Conclusion>

Normal sinus rhythm

Possible Left atrial enlargement

Borderline ECG

## 2018-05-20 ENCOUNTER — HOSPITAL ENCOUNTER (EMERGENCY)
Dept: HOSPITAL 14 - H.ER | Age: 43
Discharge: HOME | End: 2018-05-20
Payer: SELF-PAY

## 2018-05-20 VITALS
SYSTOLIC BLOOD PRESSURE: 118 MMHG | OXYGEN SATURATION: 100 % | RESPIRATION RATE: 14 BRPM | DIASTOLIC BLOOD PRESSURE: 74 MMHG | HEART RATE: 60 BPM | TEMPERATURE: 97.9 F

## 2018-05-20 VITALS — BODY MASS INDEX: 23.9 KG/M2

## 2018-05-20 DIAGNOSIS — Z85.850: ICD-10-CM

## 2018-05-20 DIAGNOSIS — N39.0: ICD-10-CM

## 2018-05-20 DIAGNOSIS — E03.9: ICD-10-CM

## 2018-05-20 DIAGNOSIS — Z79.82: ICD-10-CM

## 2018-05-20 DIAGNOSIS — K52.9: Primary | ICD-10-CM

## 2018-05-20 DIAGNOSIS — E78.00: ICD-10-CM

## 2018-05-20 DIAGNOSIS — I10: ICD-10-CM

## 2018-05-20 LAB
ALBUMIN SERPL-MCNC: 4.2 G/DL (ref 3.5–5)
ALBUMIN/GLOB SERPL: 1.2 {RATIO} (ref 1–2.1)
ALT SERPL-CCNC: 29 U/L (ref 9–52)
AST SERPL-CCNC: 36 U/L (ref 14–36)
BACTERIA #/AREA URNS HPF: (no result) /[HPF]
BASOPHILS # BLD AUTO: 0.1 K/UL (ref 0–0.2)
BASOPHILS NFR BLD: 0.6 % (ref 0–2)
BILIRUB UR-MCNC: NEGATIVE MG/DL
BUN SERPL-MCNC: 8 MG/DL (ref 7–17)
CALCIUM SERPL-MCNC: 10.9 MG/DL (ref 8.4–10.2)
COLOR UR: (no result)
EOSINOPHIL # BLD AUTO: 0.1 K/UL (ref 0–0.7)
EOSINOPHIL NFR BLD: 1.4 % (ref 0–4)
ERYTHROCYTE [DISTWIDTH] IN BLOOD BY AUTOMATED COUNT: 13.9 % (ref 11.5–14.5)
GFR NON-AFRICAN AMERICAN: > 60
GLUCOSE UR STRIP-MCNC: (no result) MG/DL
HGB BLD-MCNC: 13.3 G/DL (ref 12–16)
LEUKOCYTE ESTERASE UR-ACNC: (no result) LEU/UL
LIPASE SERPL-CCNC: 94 U/L (ref 23–300)
LYMPHOCYTES # BLD AUTO: 3 K/UL (ref 1–4.3)
LYMPHOCYTES NFR BLD AUTO: 35.1 % (ref 20–40)
MCH RBC QN AUTO: 32.6 PG (ref 27–31)
MCHC RBC AUTO-ENTMCNC: 33.5 G/DL (ref 33–37)
MCV RBC AUTO: 97.3 FL (ref 81–99)
MONOCYTES # BLD: 0.7 K/UL (ref 0–0.8)
MONOCYTES NFR BLD: 8.1 % (ref 0–10)
NEUTROPHILS # BLD: 4.8 K/UL (ref 1.8–7)
NEUTROPHILS NFR BLD AUTO: 54.8 % (ref 50–75)
NRBC BLD AUTO-RTO: 0 % (ref 0–0)
PH UR STRIP: 6 [PH] (ref 5–8)
PLATELET # BLD: 496 K/UL (ref 130–400)
PMV BLD AUTO: 6.8 FL (ref 7.2–11.7)
PROT UR STRIP-MCNC: NEGATIVE MG/DL
RBC # BLD AUTO: 4.09 MIL/UL (ref 3.8–5.2)
RBC # UR STRIP: NEGATIVE /UL
SP GR UR STRIP: 1 (ref 1–1.03)
SQUAMOUS EPITHIAL: 1 /HPF (ref 0–5)
URINE CLARITY: (no result)
UROBILINOGEN UR-MCNC: (no result) MG/DL (ref 0.2–1)
WBC # BLD AUTO: 8.7 K/UL (ref 4.8–10.8)

## 2018-05-20 PROCEDURE — 96365 THER/PROPH/DIAG IV INF INIT: CPT

## 2018-05-20 PROCEDURE — 83690 ASSAY OF LIPASE: CPT

## 2018-05-20 PROCEDURE — 80053 COMPREHEN METABOLIC PANEL: CPT

## 2018-05-20 PROCEDURE — 85025 COMPLETE CBC W/AUTO DIFF WBC: CPT

## 2018-05-20 PROCEDURE — 81025 URINE PREGNANCY TEST: CPT

## 2018-05-20 PROCEDURE — 81003 URINALYSIS AUTO W/O SCOPE: CPT

## 2018-05-20 PROCEDURE — 93005 ELECTROCARDIOGRAM TRACING: CPT

## 2018-05-20 PROCEDURE — 99284 EMERGENCY DEPT VISIT MOD MDM: CPT

## 2018-05-20 NOTE — CARD
--------------- APPROVED REPORT --------------





EKG Measurement

Heart [id number]

WI [id number]

[id number]

[id number]

[id number]



<Conclusion>

Normal sinus rhythm

Possible Anterior infarct, age undetermined

Abnormal ECG

## 2018-05-20 NOTE — ED PDOC
HPI: Abdomen


Time Seen by Provider: 05/20/18 02:04


Chief Complaint (Nursing): Abdominal Pain


History Per: Patient


History/Exam Limitations: no limitations


Current Symptoms Are (Timing): Still Present


Additional Complaint(s): 





42-year-old female, with a past medical history of thyroid cancer, 

thyroidectomy and now hypothyroidism, presents to ED complaining of abdominal 

pain, ongoing with diarrhea and vomiting. Reports 2 episodes of non-bloody, non-

bilious vomiting and 7 episodes of loose, watery, non-bloody diarrhea.








PMD: Dr. Tsai





Past Medical History


Reviewed: Historical Data, Nursing Documentation, Vital Signs


Vital Signs: 


 Last Vital Signs











Temp  97.8 F   05/20/18 01:55


 


Pulse  77   05/20/18 01:55


 


Resp  16   05/20/18 01:55


 


BP  112/67   05/20/18 01:55


 


Pulse Ox  99   05/20/18 03:09














- Medical History


PMH: Anxiety, HTN, Hypercholesterolemia, Hyperlipidemia, Hypothyroidism


   Denies: Chronic Kidney Disease





- Surgical History


Other surgeries: thyroidectomy





- Family History


Family History: States: Unknown Family Hx





- Home Medications


Home Medications: 


 Ambulatory Orders











 Medication  Instructions  Recorded


 


Acetaminophen [Tylenol] 650 mg PO Q6H PRN 04/14/15


 


Levothyroxine [Synthroid] 125 mcg PO DAILY 04/14/15


 


Sertraline HCl [Zoloft] 25 mg PO DAILY 04/14/15


 


Aspirin [Ecotrin] 81 mg PO DAILY #0 tabec 04/15/15


 


Atorvastatin [Lipitor] 20 mg PO HS #0 tab 04/15/15


 


Nicotine 21 mg/24 hr [Nicoderm Cq] 1 patch TD DAILY #0 patch 04/15/15


 


Nitrofurantoin Macrocrystals 100 mg PO BID #10 cap 02/07/18





[Macrobid]  


 


Sulfamethoxazole/Trimethoprim 1 tab PO BID #20 tab 03/11/18





[Bactrim  mg-160 mg]  


 


Naproxen [Naprosyn] 500 mg PO Q12H #20 tab 04/12/18


 


Dicyclomine [Bentyl] 20 mg PO Q12 PRN #20 tab 05/20/18


 


Nitrofurantoin Macrocrystals 100 mg PO BID #14 cap 05/20/18





[Macrobid]  


 


Ondansetron ODT [Zofran ODT] 4 mg PO Q6 PRN #8 odt 05/20/18














- Allergies


Allergies/Adverse Reactions: 


 Allergies











Allergy/AdvReac Type Severity Reaction Status Date / Time


 


No Known Allergies Allergy   Verified 11/23/14 16:09














Physical Exam





- Reviewed


Nursing Documentation Reviewed: Yes


Vital Signs Reviewed: Yes





- Physical Exam


Appears: Positive for: Non-toxic


Head Exam: Positive for: ATRAUMATIC, NORMAL INSPECTION, NORMOCEPHALIC


Skin: Positive for: Normal Color, Warm, Dry


Eye Exam: Positive for: Normal appearance, EOMI, PERRL


ENT: Positive for: Normal ENT Inspection


Neck: Positive for: Normal


Cardiovascular/Chest: Positive for: Regular Rate, Rhythm


Respiratory: Positive for: Normal Breath Sounds.  Negative for: Respiratory 

Distress


Gastrointestinal/Abdominal: Positive for: Other (Mild distension to abdomen).  

Negative for: Guarding, Rebound


Back: Positive for: Normal Inspection


Extremity: Positive for: Normal ROM


Neurologic/Psych: Positive for: Alert, Oriented (x 3)





- Laboratory Results


Result Diagrams: 


 05/20/18 02:39





 05/20/18 02:39


Urine Pregnancy POC: Negative





- ECG


O2 Sat by Pulse Oximetry: 99 (RA)


Pulse Ox Interpretation: Normal





Medical Decision Making


Medical Decision Making: 





Time: 02:14


Impression(s): 42-year-old female with gastroenteritis


Plan:


- EKG


- CMP


- Lipase


- ED Urine Dipstick


- CBC (with differentials)


- Bentyl 20 mg PO STAT


- Sodium Chloride 0.9% 1,000 ml IV 1,000 mls/hr


- Reglan 10 mg IVPB


- UA








Labs show no significant clinical abnormalities. UA is indicative for UTI. 

Patient reports improvement in symptoms. Diagnoses are Gastroenteritis and UTI.





Upon provider reevaluation patient is feeling better, is medically stable, and 

requires no further treatment in the ED at this time. Patient will be 

discharged with Rx for Zofran ODT, Bentyl and Macrobid. Counseling was provided 

and all questions were answered regarding diagnosis and need for follow up with 

PCP. There is agreement to discharge plan. Return if symptoms persist or worsen.


--------------------------------------------------------------------------------

-----





Scribe Attestation:


Documented by Nacho Graf, acting as a scribe for Waqas Alejandro MD.





Provider Scribe Attestation:


All medical record entries made by the Scribe were at my direction and 

personally dictated by me. I have reviewed the chart and agree that the record 

accurately reflects my personal performance of the history, physical exam, 

medical decision making, and the department course for this patient. I have 

also personally directed, reviewed, and agree with the discharge instructions 

and disposition.





Disposition





- Clinical Impression


Clinical Impression: 


 Gastroenteritis, UTI (urinary tract infection)








- Patient ED Disposition


Is Patient to be Admitted: No





- Disposition


Disposition Time: 02:57


Condition: STABLE


Prescriptions: 


Dicyclomine [Bentyl] 20 mg PO Q12 PRN #20 tab


 PRN Reason: abdominal pain/diarrhea


Nitrofurantoin Macrocrystals [Macrobid] 100 mg PO BID #14 cap


Ondansetron ODT [Zofran ODT] 4 mg PO Q6 PRN #8 odt


 PRN Reason: Nausea/Vomiting


Instructions:  Urinary Tract Infections in Adults, Gastroenteritis (ED)


Forms:  [facility] (English)


Print Language: [place] Islander

## 2018-06-12 ENCOUNTER — HOSPITAL ENCOUNTER (EMERGENCY)
Dept: HOSPITAL 14 - H.ER | Age: 43
Discharge: LEFT BEFORE DISCHARGE | End: 2018-06-12
Payer: SELF-PAY

## 2018-06-12 VITALS
SYSTOLIC BLOOD PRESSURE: 109 MMHG | RESPIRATION RATE: 18 BRPM | OXYGEN SATURATION: 99 % | TEMPERATURE: 98.3 F | HEART RATE: 68 BPM | DIASTOLIC BLOOD PRESSURE: 65 MMHG

## 2018-06-12 DIAGNOSIS — R10.9: Primary | ICD-10-CM

## 2018-06-12 DIAGNOSIS — E05.90: ICD-10-CM

## 2018-06-12 DIAGNOSIS — F17.200: ICD-10-CM

## 2018-06-12 DIAGNOSIS — Z85.850: ICD-10-CM

## 2018-06-12 LAB
ALBUMIN SERPL-MCNC: 4.1 G/DL (ref 3.5–5)
ALBUMIN/GLOB SERPL: 1.3 {RATIO} (ref 1–2.1)
ALT SERPL-CCNC: 29 U/L (ref 9–52)
AST SERPL-CCNC: 28 U/L (ref 14–36)
BASOPHILS # BLD AUTO: 0.1 K/UL (ref 0–0.2)
BASOPHILS NFR BLD: 1 % (ref 0–2)
BUN SERPL-MCNC: 13 MG/DL (ref 7–17)
CALCIUM SERPL-MCNC: 10.4 MG/DL (ref 8.4–10.2)
EOSINOPHIL # BLD AUTO: 0.8 K/UL (ref 0–0.7)
EOSINOPHIL NFR BLD: 10.2 % (ref 0–4)
ERYTHROCYTE [DISTWIDTH] IN BLOOD BY AUTOMATED COUNT: 13.8 % (ref 11.5–14.5)
GFR NON-AFRICAN AMERICAN: > 60
HGB BLD-MCNC: 12.8 G/DL (ref 12–16)
LIPASE SERPL-CCNC: 138 U/L (ref 23–300)
LYMPHOCYTES # BLD AUTO: 2.9 K/UL (ref 1–4.3)
LYMPHOCYTES NFR BLD AUTO: 34.5 % (ref 20–40)
MCH RBC QN AUTO: 32.9 PG (ref 27–31)
MCHC RBC AUTO-ENTMCNC: 33.8 G/DL (ref 33–37)
MCV RBC AUTO: 97.5 FL (ref 81–99)
MONOCYTES # BLD: 0.7 K/UL (ref 0–0.8)
MONOCYTES NFR BLD: 8.9 % (ref 0–10)
NEUTROPHILS # BLD: 3.8 K/UL (ref 1.8–7)
NEUTROPHILS NFR BLD AUTO: 45.4 % (ref 50–75)
NRBC BLD AUTO-RTO: 0 % (ref 0–0)
PLATELET # BLD: 525 K/UL (ref 130–400)
PMV BLD AUTO: 6.9 FL (ref 7.2–11.7)
RBC # BLD AUTO: 3.9 MIL/UL (ref 3.8–5.2)
WBC # BLD AUTO: 8.3 K/UL (ref 4.8–10.8)

## 2018-06-12 NOTE — ED PDOC
HPI: Abdomen


Time Seen by Provider: 06/12/18 14:31


Chief Complaint (Nursing): Abdominal Pain


Chief Complaint (Provider): Abdominal pain


History/Exam Limitations: no limitations


Onset/Duration Of Symptoms: Days, Intermittent Episodes


Outside of [place] travel?: No


Current Symptoms Are (Timing): Still Present


Location Of Pain/Discomfort: RUQ, LLQ, Periumbilical


Quality Of Discomfort: Gas


Associated Symptoms: denies: Fever, Chills, Nausea, Vomiting, Diarrhea, 

Constipation


Exacerbating Factors: None


Alleviating Factors: None


Last Bowel Movement: Yesterday


Additional Complaint(s): 


43 y/o female with PMHx of Thyroid  cancer, s/p thyrodectomy on 2012, Panic 

attacks, PTSD, Hypothyroidism presents to ED complaining of diffuse abdominal 

pain for more than one month. Patient states that her abdominal pain is 

intermittent, sharp like pain, as high as 9/10 when she has the pain, diffuse, 

non well localized, associated with abdominal distension and bloating 

sensation. Was seen in ED on 5/20/18 ( noted patient has 2 accounts) for 

similar complains, noted to have an UTI, and sent home in bentyl and macrobid. 

As per patient she has been taking the bentyl, but is not helping to relief her 

symptoms. Denies fevers, chills, N/V, diarrheas, blood in urine or stools, 

urinary symptoms. States she moves her bowels every day, last BM yesterday, and 

denies h/o constipation. 





PMD: Dr. Dozier


SHx: Thyrodectomy due to Thyroid cancer





Past Medical History


Vital Signs: 


 Last Vital Signs











Temp  98.3 F   06/12/18 13:58


 


Pulse  68   06/12/18 13:58


 


Resp  18   06/12/18 13:58


 


BP  109/65   06/12/18 13:58


 


Pulse Ox  99   06/12/18 17:04














- Medical History


PMH: Back Problems, Hyperthyroidism


   Denies: Diabetes, Hepatitis, HIV, HTN, Seizures, Sexually Transmitted Disease





- Surgical History


Other surgeries: Thyrodectomy





- Family History


Family History: States: Unknown Family Hx





- Social History


Current smoker - smoking cessation education provided: Yes


Alcohol: None


Drugs: Denies





- Home Medications


Home Medications: 


 Ambulatory Orders











 Medication  Instructions  Recorded


 


Famotidine [Pepcid] 20 mg PO Q12 #14 tab 04/20/17


 


Ibuprofen [Motrin] 600 mg PO Q6 #20 tab 10/06/17


 


Methylprednisolone [Medrol Dose 4 mg PO DAILY #21 mg 10/06/17





Pack (21 tabs)]  


 


Meclizine [Meclizine*] 25 mg PO Q6 #30 tab 11/29/17


 


Ondansetron HCl [Zofran] 4 mg PO Q6 #20 tablet 11/29/17














- Allergies


Allergies/Adverse Reactions: 


 Allergies











Allergy/AdvReac Type Severity Reaction Status Date / Time


 


No Known Allergies Allergy   Verified 08/03/16 13:22














Review of Systems


ROS Statement: Except As Marked, All Systems Reviewed And Found Negative (as 

per HPI)





Physical Exam





- Reviewed


Nursing Documentation Reviewed: Yes


Vital Signs Reviewed: Yes





- Physical Exam


Appears: Positive for: Non-toxic, No Acute Distress


Head Exam: Positive for: ATRAUMATIC, NORMOCEPHALIC


Skin: Positive for: Normal Color, Warm, Dry


Neck: Positive for: Normal, Supple


Cardiovascular/Chest: Positive for: Regular Rate, Rhythm.  Negative for: Chest 

Non Tender, Edema, Bradycardia, Tachycardia


Respiratory: Positive for: Normal Breath Sounds.  Negative for: Decreased 

Breath Sounds, Accessory Muscle Use, Crackles, Rales, Rhonchi, Wheezing, 

Respiratory Distress


Gastrointestinal/Abdominal: Positive for: Bowel Sounds (present), Soft, 

Tenderness (diffuse tenderness to palpation, but mostly in RUQ, and LLQ).  

Negative for: Distended, Guarding, Rebound


Back: Positive for: Normal Inspection.  Negative for: L CVA Tenderness, R CVA 

Tenderness


Extremity: Positive for: Capillary Refill (<2).  Negative for: Pedal Edema, 

Calf Tenderness


Neurologic/Psych: Positive for: Alert, Oriented





- Laboratory Results


Result Diagrams: 


 06/12/18 15:45





 06/12/18 15:45





- ECG


O2 Sat by Pulse Oximetry: 99





Medical Decision Making


Medical Decision Making: 





Abdominal pain


-diffuse, chronic


-check CBC, CMP, lipase


-Abd & pelvic CT scan w/ IV contrast


-case discussed with Dr. Stone


-re-evaluation





Re-evaluation


patient wants to sign AMA, states she needs to go home to care for a 12 years 

old son. I explained in English risk of leaving AMA, and the benefits to stay. 

Patient verbalized understanding her risk and responsibilities. Patient refuses 

CT scan.  ER precautions given. Dr. Stone made aware of AMA. 





Disposition





- Clinical Impression


Clinical Impression: 


 Abdominal pain








- Patient ED Disposition


Is Patient to be Admitted: No


Discussed With DrLucho: Karo Stone





- Disposition


Disposition: Against Medical Advice


Disposition Time: 16:55


Condition: STABLE


Additional Instructions: 


F/U with PMD in 1-2 days


ER precautions given


Forms:  [facility] (English)

## 2018-06-13 ENCOUNTER — HOSPITAL ENCOUNTER (EMERGENCY)
Dept: HOSPITAL 14 - H.ER | Age: 43
Discharge: HOME | End: 2018-06-13
Payer: SELF-PAY

## 2018-06-13 VITALS
RESPIRATION RATE: 14 BRPM | TEMPERATURE: 96.3 F | OXYGEN SATURATION: 99 % | HEART RATE: 75 BPM | DIASTOLIC BLOOD PRESSURE: 62 MMHG | SYSTOLIC BLOOD PRESSURE: 100 MMHG

## 2018-06-13 DIAGNOSIS — K59.00: ICD-10-CM

## 2018-06-13 DIAGNOSIS — E05.90: ICD-10-CM

## 2018-06-13 DIAGNOSIS — N83.209: ICD-10-CM

## 2018-06-13 DIAGNOSIS — N28.1: Primary | ICD-10-CM

## 2018-06-13 LAB
ALBUMIN SERPL-MCNC: 3.8 G/DL (ref 3.5–5)
ALBUMIN/GLOB SERPL: 1.1 {RATIO} (ref 1–2.1)
ALT SERPL-CCNC: 20 U/L (ref 9–52)
AST SERPL-CCNC: 29 U/L (ref 14–36)
BASOPHILS # BLD AUTO: 0.1 K/UL (ref 0–0.2)
BASOPHILS NFR BLD: 1.1 % (ref 0–2)
BILIRUB UR-MCNC: NEGATIVE MG/DL
BUN SERPL-MCNC: 10 MG/DL (ref 7–17)
CALCIUM SERPL-MCNC: 10 MG/DL (ref 8.4–10.2)
COLOR UR: YELLOW
EOSINOPHIL # BLD AUTO: 1.1 K/UL (ref 0–0.7)
EOSINOPHIL NFR BLD: 13.4 % (ref 0–4)
ERYTHROCYTE [DISTWIDTH] IN BLOOD BY AUTOMATED COUNT: 13.8 % (ref 11.5–14.5)
GFR NON-AFRICAN AMERICAN: > 60
GLUCOSE UR STRIP-MCNC: (no result) MG/DL
HGB BLD-MCNC: 12.6 G/DL (ref 12–16)
LEUKOCYTE ESTERASE UR-ACNC: (no result) LEU/UL
LIPASE SERPL-CCNC: 273 U/L (ref 23–300)
LYMPHOCYTES # BLD AUTO: 2.4 K/UL (ref 1–4.3)
LYMPHOCYTES NFR BLD AUTO: 28.8 % (ref 20–40)
MCH RBC QN AUTO: 33.1 PG (ref 27–31)
MCHC RBC AUTO-ENTMCNC: 33.7 G/DL (ref 33–37)
MCV RBC AUTO: 98 FL (ref 81–99)
MONOCYTES # BLD: 0.6 K/UL (ref 0–0.8)
MONOCYTES NFR BLD: 7.6 % (ref 0–10)
NEUTROPHILS # BLD: 4.2 K/UL (ref 1.8–7)
NEUTROPHILS NFR BLD AUTO: 49.1 % (ref 50–75)
NRBC BLD AUTO-RTO: 0 % (ref 0–0)
PH UR STRIP: 6 [PH] (ref 5–8)
PLATELET # BLD: 499 K/UL (ref 130–400)
PMV BLD AUTO: 7.1 FL (ref 7.2–11.7)
PROT UR STRIP-MCNC: NEGATIVE MG/DL
RBC # BLD AUTO: 3.81 MIL/UL (ref 3.8–5.2)
RBC # UR STRIP: NEGATIVE /UL
SP GR UR STRIP: 1.03 (ref 1–1.03)
SQUAMOUS EPITHIAL: 4 /HPF (ref 0–5)
URINE CLARITY: (no result)
UROBILINOGEN UR-MCNC: (no result) MG/DL (ref 0.2–1)
WBC # BLD AUTO: 8.5 K/UL (ref 4.8–10.8)

## 2018-06-13 PROCEDURE — 99284 EMERGENCY DEPT VISIT MOD MDM: CPT

## 2018-06-13 PROCEDURE — 81003 URINALYSIS AUTO W/O SCOPE: CPT

## 2018-06-13 PROCEDURE — 80053 COMPREHEN METABOLIC PANEL: CPT

## 2018-06-13 PROCEDURE — 83690 ASSAY OF LIPASE: CPT

## 2018-06-13 PROCEDURE — 74177 CT ABD & PELVIS W/CONTRAST: CPT

## 2018-06-13 PROCEDURE — 85025 COMPLETE CBC W/AUTO DIFF WBC: CPT

## 2018-06-13 PROCEDURE — 81025 URINE PREGNANCY TEST: CPT

## 2018-06-13 NOTE — ED PDOC
HPI: Abdomen


Time Seen by Provider: 06/13/18 14:56


Chief Complaint (Nursing): Abdominal Pain


History Per: Patient,  (Vianca #[id number] ([place]))


Additional Complaint(s): 





Pt. states for the past 3 weeks she's had LLQ and epigastric abdominal pain 

associated with non-bloody watery diarrhea and non-bloody vomiting which began 

2 days ago. Pt. states shew as seen in ED yesterday and had blood work done. CT 

was ordered but she had to sign AMA as she could not find . Denies 

previous abdominal surgeries, dysuria, hematuria, back pain, melena, 

hematochezia, BRBPR, hematemesis. 





Past Medical History


Reviewed: Historical Data, Nursing Documentation, Vital Signs


Vital Signs: 


 Last Vital Signs











Temp  96.3 F L  06/13/18 14:33


 


Pulse  75   06/13/18 14:33


 


Resp  14   06/13/18 14:33


 


BP  100/62   06/13/18 14:33


 


Pulse Ox  99   06/13/18 16:31














- Medical History


PMH: Back Problems, Gastritis, Hyperthyroidism


   Denies: Diabetes, Hepatitis, HIV, HTN, Chronic Kidney Disease, Seizures, 

Sexually Transmitted Disease





- Family History


Family History: States: No Known Family Hx





- Home Medications


Home Medications: 


 Ambulatory Orders











 Medication  Instructions  Recorded


 


Famotidine [Pepcid] 20 mg PO Q12 #14 tab 04/20/17


 


Ibuprofen [Motrin] 600 mg PO Q6 #20 tab 10/06/17


 


Methylprednisolone [Medrol Dose 4 mg PO DAILY #21 mg 10/06/17





Pack (21 tabs)]  


 


Meclizine [Meclizine*] 25 mg PO Q6 #30 tab 11/29/17


 


Ondansetron HCl [Zofran] 4 mg PO Q6 #20 tablet 11/29/17


 


Docusate Sodium [Colace] 100 mg PO BID PRN #10 capsule 06/13/18














- Allergies


Allergies/Adverse Reactions: 


 Allergies











Allergy/AdvReac Type Severity Reaction Status Date / Time


 


No Known Allergies Allergy   Verified 08/03/16 13:22














Review of Systems


ROS Statement: Except As Marked, All Systems Reviewed And Found Negative


Gastrointestinal: Positive for: Nausea, Vomiting, Abdominal Pain, Diarrhea





Physical Exam





- Physical Exam


Appears: Positive for: Well, Non-toxic, No Acute Distress


Skin: Positive for: Normal Color, Warm.  Negative for: Rash


Eye Exam: Positive for: Normal appearance


Cardiovascular/Chest: Positive for: Regular Rate, Rhythm


Respiratory: Positive for: CNT, Normal Breath Sounds


Gastrointestinal/Abdominal: Positive for: Normal Exam, Bowel Sounds, Soft, 

Tenderness (mild LLQ tenderness), Other (negative Huynh's sign)


Back: Negative for: L CVA Tenderness, R CVA Tenderness


Neurologic/Psych: Positive for: Alert, Oriented





- Laboratory Results


Result Diagrams: 


 06/13/18 16:25





 06/13/18 16:25





- ECG


O2 Sat by Pulse Oximetry: 99





- Progress


ED Course And Treament: 





Labs, CT abd/pelvis w/ IV contrast ordered. 





1740


CT abd/pelvis w/ IV contrast: Findings consistent with mild constipation.





Mild fatty hepatic infiltration.





Small fat containing umbilical hernia.





Probable small hyperdense right renal cyst. 





Prominent right ovarian follicle or small cyst





 #[id number]


CT results discussed in detail with patient. Informed of lab results.


Advised to f/u with PMD for monitoring of cysts. 


Agrees with care. 


Pt. in no distress. States pain has improved. 





Disposition





- Clinical Impression


Clinical Impression: 


 Renal cyst, Ovarian cyst, Constipation








- Patient ED Disposition


Is Patient to be Admitted: No





- Disposition


Referrals: 


[email or web address] [Outside]


Julieta Mike MD [Medical Doctor] - 


Disposition: Routine/Home


Disposition Time: 17:40


Condition: IMPROVED


Additional Instructions: 


Follow up with PMD for further evaluation.


Return to ED immediately if symptoms worsen. 


Prescriptions: 


Docusate Sodium [Colace] 100 mg PO BID PRN #10 capsule


 PRN Reason: Constipation


Instructions:  Constipation, Adult (DC), Ovarian Cyst (DC)


Forms:  [email or web address] ([place])


Print Language: Kazakh

## 2018-06-13 NOTE — CT
PROCEDURE:  CT abdomen pelvis dated 06/13/2018 



HISTORY:

Left lower quadrant abdominal pain



COMPARISON:

Comparison made with prior CT scan of the abdomen and pelvis dated 

04/27/2014.



TECHNIQUE:

Contiguous axial images of the abdomen and pelvis performed following 

intravenous injection of contrast material.  Additional 2D sagittal 

and coronal reformats generated. 



This CT exam was performed using one or more of the following dose 

reduction techniques: Automated exposure control, adjustment of the 

mA and/or kV according to patient size, and/or use of iterative 

reconstruction technique.



Contrast dose: 100 cc Omnipaque 350 contrast material. 



Radiation dose:



Total exam DLP = 410.82 mGy-cm.



FINDINGS:



LOWER THORAX:

Lung bases clear. .  Minor scarring changes right middle lobe. No 

infiltrate effusion or basilar pneumothorax. . 



Tiny hiatal hernia. 



LIVER:

Liver exhibits normal size measuring 17 cm in CC dimension.  Mild 

diffuse fatty hepatic infiltration. No obvious hepatic mass 

collection or calcification.  Portal and splenic veins are opacified. 



GALLBLADDER AND BILE DUCTS:

Unremarkable. No evidence of cholelithiasis. 



PANCREAS:

Unremarkable. No mass. No ductal dilatation.



SPLEEN:

Unremarkable. No splenomegaly. 



ADRENALS:

No adrenal lesions seen. 



KIDNEYS AND URETERS:

Unremarkable. No stone or hydronephrosis. Small approximately 7 mm 

rounded low-attenuation focus upper pole right kidney most likely 

representing cyst. 



BLADDER:

Grossly unremarkable.



REPRODUCTIVE:

Suspect of small approximately 1 cm prominent right ovarian follicle 

or small cyst. Small left ovarian follicular cyst.  



APPENDIX:

Normal appendix.



BOWEL:

Evaluation of the bowel is limited due to the lack of oral contrast 

material. 



Stomach contains food debris and air though is incompletely 

distended.  Visualized loops of small bowel exhibit normal contour 

caliber. No evidence acute mechanical small bowel obstruction.  There 

is a large amount of stool seen throughout the most of the colon 

consistent with mild constipation. No definitive evidence of abnormal 

mural wall thickening 



PERITONEUM:

Unremarkable. No fluid collection. No free air. Small fat containing 

umbilical hernia. 



LYMPH NODES:

Unremarkable. No enlarged lymph nodes. 



VASCULATURE:

Unremarkable. No aortic aneurysm. 



BONES:

No fracture or destructive lesion. 



OTHER FINDINGS:

None. 



IMPRESSION:

Findings consistent with mild constipation.



Mild fatty hepatic infiltration.



Small fat containing umbilical hernia.



Probable small hyperdense right renal cyst. 



Prominent right ovarian follicle or small cyst

## 2018-11-16 ENCOUNTER — HOSPITAL ENCOUNTER (EMERGENCY)
Dept: HOSPITAL 14 - H.ER | Age: 43
Discharge: HOME | End: 2018-11-16
Payer: COMMERCIAL

## 2018-11-16 VITALS
RESPIRATION RATE: 16 BRPM | DIASTOLIC BLOOD PRESSURE: 63 MMHG | TEMPERATURE: 98.2 F | HEART RATE: 74 BPM | SYSTOLIC BLOOD PRESSURE: 128 MMHG

## 2018-11-16 VITALS — BODY MASS INDEX: 23.9 KG/M2

## 2018-11-16 VITALS — OXYGEN SATURATION: 97 %

## 2018-11-16 DIAGNOSIS — R07.9: Primary | ICD-10-CM

## 2018-11-16 DIAGNOSIS — I10: ICD-10-CM

## 2018-11-16 DIAGNOSIS — Z79.82: ICD-10-CM

## 2018-11-16 LAB
APTT BLD: 34.3 SECONDS (ref 25.6–37.1)
BASOPHILS # BLD AUTO: 0.1 K/UL (ref 0–0.2)
BASOPHILS NFR BLD: 1 % (ref 0–2)
BNP SERPL-MCNC: 53 PG/ML (ref 0–450)
BUN SERPL-MCNC: 9 MG/DL (ref 7–17)
CALCIUM SERPL-MCNC: 10.5 MG/DL (ref 8.4–10.2)
D DIMER: < 200 NG/MLDDU (ref 0–230)
EOSINOPHIL # BLD AUTO: 0.1 K/UL (ref 0–0.7)
EOSINOPHIL NFR BLD: 1.4 % (ref 0–4)
ERYTHROCYTE [DISTWIDTH] IN BLOOD BY AUTOMATED COUNT: 14.2 % (ref 11.5–14.5)
GFR NON-AFRICAN AMERICAN: > 60
HGB BLD-MCNC: 12.6 G/DL (ref 12–16)
INR PPP: 0.9
LYMPHOCYTES # BLD AUTO: 3.3 K/UL (ref 1–4.3)
LYMPHOCYTES NFR BLD AUTO: 33.2 % (ref 20–40)
MCH RBC QN AUTO: 31.6 PG (ref 27–31)
MCHC RBC AUTO-ENTMCNC: 33.1 G/DL (ref 33–37)
MCV RBC AUTO: 95.3 FL (ref 81–99)
MONOCYTES # BLD: 0.5 K/UL (ref 0–0.8)
MONOCYTES NFR BLD: 5.5 % (ref 0–10)
NEUTROPHILS # BLD: 5.8 K/UL (ref 1.8–7)
NEUTROPHILS NFR BLD AUTO: 58.9 % (ref 50–75)
NRBC BLD AUTO-RTO: 0.1 % (ref 0–0)
PLATELET # BLD: 539 K/UL (ref 130–400)
PMV BLD AUTO: 6.8 FL (ref 7.2–11.7)
PROTHROMBIN TIME: 10.4 SECONDS (ref 9.8–13.1)
RBC # BLD AUTO: 3.98 MIL/UL (ref 3.8–5.2)
WBC # BLD AUTO: 9.9 K/UL (ref 4.8–10.8)

## 2018-11-16 PROCEDURE — 85610 PROTHROMBIN TIME: CPT

## 2018-11-16 PROCEDURE — 83880 ASSAY OF NATRIURETIC PEPTIDE: CPT

## 2018-11-16 PROCEDURE — 99285 EMERGENCY DEPT VISIT HI MDM: CPT

## 2018-11-16 PROCEDURE — 81025 URINE PREGNANCY TEST: CPT

## 2018-11-16 PROCEDURE — 84484 ASSAY OF TROPONIN QUANT: CPT

## 2018-11-16 PROCEDURE — 96374 THER/PROPH/DIAG INJ IV PUSH: CPT

## 2018-11-16 PROCEDURE — 71046 X-RAY EXAM CHEST 2 VIEWS: CPT

## 2018-11-16 PROCEDURE — 93005 ELECTROCARDIOGRAM TRACING: CPT

## 2018-11-16 PROCEDURE — 85378 FIBRIN DEGRADE SEMIQUANT: CPT

## 2018-11-16 PROCEDURE — 80048 BASIC METABOLIC PNL TOTAL CA: CPT

## 2018-11-16 PROCEDURE — 85730 THROMBOPLASTIN TIME PARTIAL: CPT

## 2018-11-16 PROCEDURE — 85025 COMPLETE CBC W/AUTO DIFF WBC: CPT

## 2018-11-16 NOTE — RAD
Date of service: 



11/16/2018



HISTORY:

Chest pain.



COMPARISON:

04/12/2018



TECHNIQUE:

Chest PA and lateral



FINDINGS:



LUNGS:

No active pulmonary disease.



PLEURA:

No significant pleural effusion identified. No pneumothorax apparent.



CARDIOVASCULAR:

No aortic atherosclerotic calcification present.



Normal cardiac size. No pulmonary vascular congestion. 



OSSEOUS STRUCTURES:

No significant abnormalities.



VISUALIZED UPPER ABDOMEN:

Normal.



OTHER FINDINGS:

None.



IMPRESSION:

No active disease. No significant interval change compared to the 

prior examination(s).

## 2018-11-16 NOTE — ED PDOC
HPI: Chest Pain


Time Seen by Provider: 18 09:25


Chief Complaint (Nursing): Chest Pain


Chief Complaint (Provider): Chest Pain, Back Pain


History Per: Patient


History/Exam Limitations: no limitations


Onset/Duration Of Symptoms: Other (x2 weeks)


Current Symptoms Are (Timing): Still Present


Additional Complaint(s): 


43 year old female presenting for evaluation of chest pain and back pain x2 

weeks. Patient reports her pain is in her bilateral chest, right shoulder, and 

right upper back. Patient reports her pain is worse when moving around, but 

constant. Patient denies any pleuritic or exertional component. Patient further 

denies any difficulty breathing, dizziness, syncope, fever, or cough. Patient 

states she took no medication or her pain. 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





PMD: [facility]








Past Medical History


Reviewed: Historical Data, Nursing Documentation, Vital Signs


Vital Signs: 





                                Last Vital Signs











Temp  98.3 F   18 09:22


 


Pulse  84   18 09:22


 


Resp  18   18 09:22


 


BP  99/63 L  18 09:22


 


Pulse Ox  97   18 09:22














- Medical History


PMH: Anxiety, HTN, Hypercholesterolemia, Hyperlipidemia, Hypothyroidism


   Denies: Chronic Kidney Disease





- Surgical History


Surgical History: No Surg Hx


Other surgeries: Thyroidectomy





- Family History


Family History: States: Unknown Family Hx





- Home Medications


Home Medications: 


                                Ambulatory Orders











 Medication  Instructions  Recorded


 


RX: Acetaminophen [Tylenol] 650 mg PO Q6H PRN 04/14/15


 


RX: Levothyroxine [Synthroid] 125 mcg PO DAILY 04/14/15


 


RX: Sertraline HCl [Zoloft] 25 mg PO DAILY 04/14/15


 


RX: Aspirin [Ecotrin] 81 mg PO DAILY #0 tabec 04/15/15


 


RX: Atorvastatin [Lipitor] 20 mg PO HS #0 tab 04/15/15


 


RX: Nicotine 21 mg/24 hr [Nicoderm 1 patch TD DAILY #0 patch 04/15/15





Cq]  


 


Nitrofurantoin Macrocrystals 100 mg PO BID #10 cap 18





[Macrobid]  


 


Sulfamethoxazole/Trimethoprim 1 tab PO BID #20 tab 18





[Bactrim  mg-160 mg]  


 


Naproxen [Naprosyn] 500 mg PO Q12H #20 tab 18


 


Dicyclomine [Bentyl] 20 mg PO Q12 PRN #20 tab 18


 


Nitrofurantoin Macrocrystals 100 mg PO BID #14 cap 18





[Macrobid]  


 


Ondansetron ODT [Zofran ODT] 4 mg PO Q6 PRN #8 odt 18


 


Cyclobenzaprine [Cyclobenzaprine 10 mg PO TID #15 tab 18





HCl]  


 


RX: Naproxen 500 mg PO BID #20 tab 18














- Allergies


Allergies/Adverse Reactions: 


                                    Allergies











Allergy/AdvReac Type Severity Reaction Status Date / Time


 


No Known Allergies Allergy   Verified 14 16:09














BREA Risk Score for UA/NSTEMI





- BREA Risk Score


Age > 64: NO


3 or more CAD Risk Factors: NO


Known CAD (Stenosis greater than 50%): NO


Aspirin use in past 7 days: NO


Severe Angina: NO


EKG ST changes greater than 0.5mm: NO


Positive Cardiac Marker: NO


BREA Score: 0


Risk %: 5%





Wells Criteria for PE





- Wells Criteria for Pulmonary Embolism


Clinical Signs and Symptoms of DVT: No


P.E is #1 Diagnosis, or Equally Likely: No


Heart Rate >100: No


Immobilization at least 3 days;Surgery previous 4 weeks: No


Previous, objectively diagnosed PE or DVT: No


Hemoptysis: No


Malignancy w/treatment within 6 months, or palliative: No


Total Score: 0





Review of Systems


ROS Statement: Except As Marked, All Systems Reviewed And Found Negative


Constitutional: Negative for: Fever, Chills


Cardiovascular: Positive for: Chest Pain


Respiratory: Negative for: Cough, Shortness of Breath, SOB with Exertion, 

Pleuritic Pain


Musculoskeletal: Positive for: Shoulder Pain, Back Pain


Neurological: Positive for: Dizziness





Physical Exam





- Reviewed


Nursing Documentation Reviewed: Yes


Vital Signs Reviewed: Yes





- Physical Exam


Appears: Positive for: Non-toxic, No Acute Distress


Head Exam: Positive for: ATRAUMATIC, NORMAL INSPECTION, NORMOCEPHALIC


Skin: Positive for: Normal Color, Warm, Dry.  Negative for: Rash


Eye Exam: Positive for: EOMI, Normal appearance, PERRL


ENT: Positive for: Normal ENT Inspection


Neck: Positive for: Normal, Painless ROM, Supple


Cardiovascular/Chest: Positive for: Regular Rate, Rhythm.  Negative for: Chest 

Non Tender (bilateral anterior chest wall tenderness), Murmur


Respiratory: Positive for: Normal Breath Sounds.  Negative for: Respiratory 

Distress


Gastrointestinal/Abdominal: Positive for: Normal Exam, Soft.  Negative for: Te

nderness


Back: Positive for: Other (right upper back tenderness).  Negative for: 

Vertebral Tenderness


Extremity: Positive for: Tenderness (right shoulder).  Negative for: Deformity


Neurologic/Psych: Positive for: Alert, Oriented





- Laboratory Results


Result Diagrams: 


                                 18 11:05





                                 18 11:05





- ECG


ECG: Positive for: Interpreted By Me, Viewed By Me


ECG Rhythm: Positive for: Normal QRS, Normal ST Segment, Sinus Rhythm (65 BPM). 

Negative for: ST/T Changes


O2 Sat by Pulse Oximetry: 97 (RA)


Pulse Ox Interpretation: Normal





- Progress


Re-evaluation Time: 12:40


Condition: Re-examined, Improved





Medical Decision Making


Medical Decision Makin


Initial Impression: Chest pain, back pain, and shoulder pain. Differential 

diagnoses include, but are not limited to musculoskeletal pain, arthritis, and 

less likely ACS, or PE. 


 


Plan:


-EKG


-BNP


-BMP


-Troponin


-Urine dip


-CBC


-D-dimer


-PTT/PT


-CXR


-Flexeril 10mg PO


-Toradol 15mg IVP


-Cardiac monitor


-Reevaluation





1120


CXR FINDINGS:


LUNGS:


No active pulmonary disease.


PLEURA:


No significant pleural effusion identified. No pneumothorax apparent.


CARDIOVASCULAR:


No aortic atherosclerotic calcification present.


Normal cardiac size. No pulmonary vascular congestion. 


OSSEOUS STRUCTURES:


No significant abnormalities.


VISUALIZED UPPER ABDOMEN:


Normal.


OTHER FINDINGS:


None.





IMPRESSION:


No active disease. No significant interval change compared to the prior 

examination(s).





-------------------------------

--------------------------------------------------------------------------------


---


Scribe Attestation: 


Documented by Lewis Hernandez, acting as a scribe for Mechelle Swain MD.





Provider Scribe Attestation:


All medical record entries made by the Martha were at my direction and 

personally dictated by me. I have reviewed the chart and agree that the record 

accurately reflects my personal performance of the history, physical exam, 

medical decision making, and the department course for this patient. I have also

personally directed, reviewed, and agree with the discharge instructions and 

disposition.








Disposition





- Clinical Impression


Clinical Impression: 


 Chest pain








- Patient ED Disposition


Is Patient to be Admitted: No


Doctor Will See Patient In The: Office


Counseled Patient/Family Regarding: Studies Performed, Diagnosis, Need For 

Followup





- Disposition


Referrals: 


[facility] [Outside]


Disposition: Routine/Home


Disposition Time: 12:43


Condition: GOOD


Additional Instructions: 





CLARISSA MAXWELL, thank you for letting us take care of you today. Your 

provider was Mechelle Swain MD and you were treated for CHEST PAIN. The 

emergency medical care you received today was directed at your acute symptoms. 

If you were prescribed any medication, please fill it and take as directed. It 

may take several days for your symptoms to resolve. Return to the Emergency 

Department if your symptoms worsen, do not improve, or if you have any other 

problems.





Please contact your doctor or call one of the physicians/clinics you have been 

referred to that are listed on the Patient Visit Information form that is 

included in your discharge packet. Bring any paperwork you were given at 

discharge with you along with any medications you are taking to your follow up 

visit. Our treatment cannot replace ongoing medical care by a primary care 

provider outside of the emergency department.





Thank you for allowing the [facility] team to be part of your care today.








If you had an X-Ray or CT scan: A Radiologist will review the ED reading if any 

change in treatment is needed we will contact you.***





If you had a blood, urine, or wound culture: It will take several days for the 

results, if any change in treatment is needed we will contact you.***





If you had an STI test: It will take 48 hours for the results. Please call after

 1 week if you have not heard back.***


Prescriptions: 


Cyclobenzaprine [Cyclobenzaprine HCl] 10 mg PO TID #15 tab


RX: Naproxen 500 mg PO BID #20 tab


Instructions:  Chest Pain


Print Language: Swedish

## 2021-10-01 NOTE — CARD
--------------- APPROVED REPORT --------------





Date of service: 11/16/2018



EKG Measurement

Heart [id number]

[id number]

[id number]

[id number]

[id number]



<Conclusion>

Normal sinus rhythm

Normal Electrocardiogram
.